# Patient Record
Sex: MALE | Race: BLACK OR AFRICAN AMERICAN
[De-identification: names, ages, dates, MRNs, and addresses within clinical notes are randomized per-mention and may not be internally consistent; named-entity substitution may affect disease eponyms.]

---

## 2017-03-21 ENCOUNTER — HOSPITAL ENCOUNTER (INPATIENT)
Dept: HOSPITAL 23 - P2S | Age: 52
LOS: 4 days | DRG: 897 | End: 2017-03-25
Admitting: SPECIALIST
Payer: COMMERCIAL

## 2017-03-21 DIAGNOSIS — F17.210: ICD-10-CM

## 2017-03-21 DIAGNOSIS — J44.9: ICD-10-CM

## 2017-03-21 DIAGNOSIS — F14.20: ICD-10-CM

## 2017-03-21 DIAGNOSIS — F33.1: ICD-10-CM

## 2017-03-21 DIAGNOSIS — F10.239: Primary | ICD-10-CM

## 2017-03-21 PROCEDURE — HZ2ZZZZ DETOXIFICATION SERVICES FOR SUBSTANCE ABUSE TREATMENT: ICD-10-PCS | Performed by: SPECIALIST

## 2017-03-22 LAB
BARBITURATES UR QL SCN: 0.9 MG/DL (ref 0.2–2)
BARBITURATES UR QL SCN: 3.6 G/DL (ref 3.5–5)
BARBITURATES: (no result)
BASOPHIL#: 0 X10E3 (ref 0–0.3)
BASOPHIL%: 0.6 % (ref 0–2.5)
BENZODIAZ UR QL SCN: 18 U/L (ref 10–42)
BENZODIAZ UR QL SCN: 19 U/L (ref 10–40)
BENZODIAZEPINES: (no result)
BLOOD UREA NITROGEN: 22 MG/DL (ref 9–23)
BUN/CREATININE RATIO: 22
BZE UR QL SCN: 72 U/L (ref 32–92)
CALCIUM SERUM: 9.2 MG/DL (ref 8.4–10.2)
CK MB SERPL-RTO: 13.8 % (ref 11–15.5)
CK MB SERPL-RTO: 32.7 G/DL (ref 30–36)
COCAINE: (no result)
CREATININE SERUM: 1 MG/DL (ref 0.6–1.4)
DIFF IND: NO
DX ICD CODE: (no result)
DX ICD CODE: (no result)
EOSINOPHIL#: 0.1 X10E3 (ref 0–0.7)
EOSINOPHIL%: 1.7 % (ref 0–7)
GLOM FILT RATE ESTIMATED: (no result) ML/MIN (ref 60–?)
GLUCOSE FASTING: 96 MG/DL (ref 70–110)
HEMATOCRIT: 47 % (ref 38–50)
HEMOGLOBIN: 15.4 GM/DL (ref 13–16)
KETONES UR QL: 108 MMOL/L (ref 100–111)
KETONES UR QL: 25 MMOL/L (ref 22–31)
LYMPHOCYTE#: 1.6 X10E3 (ref 1–3.5)
LYMPHOCYTE%: 18.9 % (ref 17–45)
MEAN CELL VOLUME: 97.8 FL (ref 83–96)
MEAN CORPUSCULAR HEMOGLOBIN: 32 PG (ref 28–34)
MEAN PLATELET VOLUME: 8.7 FL (ref 6.5–11.5)
MONOCYTE#: 0.8 X10E3 (ref 0–1)
MONOCYTE%: 9.1 % (ref 3–12)
NEUTROPHIL#: 5.9 X10E3 (ref 1.5–7.1)
NEUTROPHIL%: 69.7 % (ref 40–75)
OPIATES: (no result)
PLATELET COUNT: 286 X10E3 (ref 140–420)
POTASSIUM: 4.1 MMOL/L (ref 3.5–5.1)
PROTEIN TOTAL SERUM: 6.6 G/DL (ref 6–8.3)
RED BLOOD COUNT: 4.81 X10E (ref 3.9–5.6)
SODIUM: 139 MMOL/L (ref 135–145)
TRICYCLIC ANTIDEPRESSANTS: (no result)
U METHADONE: (no result)
URINE APPEARANCE: (no result)
URINE BILIRUBIN: (no result)
URINE BLOOD: (no result)
URINE COLOR: YELLOW
URINE GLUCOSE: (no result) MG/DL
URINE KETONE: (no result)
URINE LEUKOCYTE ESTERASE: (no result)
URINE NITRATE: (no result)
URINE PH: 5.5 (ref 5–8)
URINE PROTEIN: (no result)
URINE SOURCE: (no result)
URINE SPECIFIC GRAVITY: 1.02 (ref 1–1.03)
URINE UROBILINOGEN: 0.2 MG/DL
WHITE BLOOD COUNT: 8.5 X10E3 (ref 4–10.5)

## 2017-06-02 ENCOUNTER — HOSPITAL ENCOUNTER (EMERGENCY)
Facility: HOSPITAL | Age: 52
Discharge: HOME | End: 2017-06-02
Attending: EMERGENCY MEDICINE | Admitting: EMERGENCY MEDICINE

## 2017-06-02 VITALS
OXYGEN SATURATION: 99 % | DIASTOLIC BLOOD PRESSURE: 84 MMHG | WEIGHT: 170 LBS | HEIGHT: 67 IN | TEMPERATURE: 98.2 F | HEART RATE: 86 BPM | RESPIRATION RATE: 16 BRPM | BODY MASS INDEX: 26.68 KG/M2 | SYSTOLIC BLOOD PRESSURE: 121 MMHG

## 2017-06-02 DIAGNOSIS — Z71.1 CONCERN ABOUT STD IN MALE WITHOUT DIAGNOSIS: Primary | ICD-10-CM

## 2017-06-02 LAB
BACTERIA UR QL AUTO: ABNORMAL /HPF
BILIRUB UR QL STRIP: NEGATIVE
CLARITY UR: CLEAR
COLOR UR: ABNORMAL
GLUCOSE UR STRIP-MCNC: NEGATIVE MG/DL
HGB UR QL STRIP.AUTO: ABNORMAL
HYALINE CASTS UR QL AUTO: ABNORMAL /LPF
KETONES UR QL STRIP: ABNORMAL
LEUKOCYTE ESTERASE UR QL STRIP.AUTO: NEGATIVE
NITRITE UR QL STRIP: NEGATIVE
PH UR STRIP.AUTO: 5.5 [PH] (ref 5–8)
PROT UR QL STRIP: ABNORMAL
RBC # UR: ABNORMAL /HPF
REF LAB TEST METHOD: ABNORMAL
SP GR UR STRIP: 1.03 (ref 1–1.03)
SQUAMOUS #/AREA URNS HPF: ABNORMAL /HPF
UROBILINOGEN UR QL STRIP: ABNORMAL
WBC UR QL AUTO: ABNORMAL /HPF

## 2017-06-02 PROCEDURE — 25010000002 CEFTRIAXONE PER 250 MG: Performed by: NURSE PRACTITIONER

## 2017-06-02 PROCEDURE — 87591 N.GONORRHOEAE DNA AMP PROB: CPT | Performed by: NURSE PRACTITIONER

## 2017-06-02 PROCEDURE — 87491 CHLMYD TRACH DNA AMP PROBE: CPT | Performed by: NURSE PRACTITIONER

## 2017-06-02 PROCEDURE — 96372 THER/PROPH/DIAG INJ SC/IM: CPT

## 2017-06-02 PROCEDURE — 81001 URINALYSIS AUTO W/SCOPE: CPT | Performed by: NURSE PRACTITIONER

## 2017-06-02 PROCEDURE — 99284 EMERGENCY DEPT VISIT MOD MDM: CPT

## 2017-06-02 RX ORDER — AZITHROMYCIN 250 MG/1
1000 TABLET, FILM COATED ORAL ONCE
Status: COMPLETED | OUTPATIENT
Start: 2017-06-02 | End: 2017-06-02

## 2017-06-02 RX ORDER — CEFTRIAXONE SODIUM 250 MG/1
250 INJECTION, POWDER, FOR SOLUTION INTRAMUSCULAR; INTRAVENOUS EVERY 24 HOURS
Status: DISCONTINUED | OUTPATIENT
Start: 2017-06-02 | End: 2017-06-02 | Stop reason: HOSPADM

## 2017-06-02 RX ADMIN — CEFTRIAXONE SODIUM 250 MG: 250 INJECTION, POWDER, FOR SOLUTION INTRAMUSCULAR; INTRAVENOUS at 10:55

## 2017-06-02 RX ADMIN — AZITHROMYCIN 1000 MG: 250 TABLET, FILM COATED ORAL at 10:54

## 2017-06-02 NOTE — ED PROVIDER NOTES
"EMERGENCY DEPARTMENT ENCOUNTER    CHIEF COMPLAINT  Chief Complaint: Exposure to STD  History given by: Patient  History limited by: N/A  Room Number: 04/04  PMD: No Known Provider      HPI:  Pt is a 51 y.o. male who presents with exposure to an STD onset last night. Pt reports that he received oral sex last night and now reports penile pain. Pt reports mild dysuria and testicular tenderness. Pt denies any penile discharge or lesions. Pt reports that he relapsed last night and used cocaine and drank alcohol. Pt reports a similar episode within the past year.     Duration: Last night  Timing: Gradual  Location:   Radiation: Does not radiate  Quality: \"genital pain\"  Intensity/Severity: Moderate  Progression: Worsening  Associated Symptoms: Mild dysuria   Aggravating Factors: None  Alleviating Factors: None  Previous Episodes: Hx of STD exposure  Treatment before arrival: None    PAST MEDICAL HISTORY  Active Ambulatory Problems     Diagnosis Date Noted   • No Active Ambulatory Problems     Resolved Ambulatory Problems     Diagnosis Date Noted   • No Resolved Ambulatory Problems     No Additional Past Medical History       PAST SURGICAL HISTORY  Past Surgical History:   Procedure Laterality Date   • SHOULDER ACROMIOPLASTY         FAMILY HISTORY  History reviewed. No pertinent family history.    SOCIAL HISTORY  Social History     Social History   • Marital status: Significant Other     Spouse name: N/A   • Number of children: N/A   • Years of education: N/A     Occupational History   • Not on file.     Social History Main Topics   • Smoking status: Current Every Day Smoker   • Smokeless tobacco: Not on file   • Alcohol use Yes      Comment: socially   • Drug use: Not on file   • Sexual activity: Yes     Other Topics Concern   • Not on file     Social History Narrative   • No narrative on file         ALLERGIES  Ativan [lorazepam]; Haldol [haloperidol]; and Penicillins    REVIEW OF SYSTEMS  Review of Systems " "  Constitutional: Negative.  Negative for activity change, appetite change ( decreased), chills and fever.   HENT: Negative for congestion, ear pain, rhinorrhea, sinus pressure and sore throat.    Eyes: Negative.    Respiratory: Negative.  Negative for cough and shortness of breath.    Cardiovascular: Negative.  Negative for chest pain, palpitations and leg swelling ( pedal).   Gastrointestinal: Negative for abdominal pain, diarrhea, nausea and vomiting.   Endocrine: Negative.    Genitourinary: Positive for dysuria (mild), penile pain and testicular pain (\"tender\"). Negative for decreased urine volume, difficulty urinating, discharge, frequency and urgency.   Musculoskeletal: Negative.  Negative for back pain.   Skin: Negative.  Negative for rash.   Allergic/Immunologic: Negative.    Neurological: Negative.  Negative for dizziness, weakness, light-headedness, numbness and headaches.   Hematological: Negative.    Psychiatric/Behavioral: Negative.  The patient is not nervous/anxious.    All other systems reviewed and are negative.      PHYSICAL EXAM  ED Triage Vitals   Temp Heart Rate Resp BP SpO2   06/02/17 1010 06/02/17 1010 06/02/17 1011 06/02/17 1011 06/02/17 1010   98 °F (36.7 °C) 107 14 114/82 97 %      Temp src Heart Rate Source Patient Position BP Location FiO2 (%)   06/02/17 1010 06/02/17 1010 06/02/17 1011 06/02/17 1011 --   Tympanic Monitor Sitting Left arm        Physical Exam   Constitutional: He is well-developed, well-nourished, and in no distress. No distress.   HENT:   Head: Normocephalic.   Mouth/Throat: Oropharynx is clear and moist and mucous membranes are normal.   Eyes: Pupils are equal, round, and reactive to light.   Neck: Normal range of motion.   Cardiovascular: Normal rate, regular rhythm and normal heart sounds.    Pulmonary/Chest: Effort normal and breath sounds normal. He has no wheezes.   Abdominal: Soft. Bowel sounds are normal. There is no tenderness.   Genitourinary: Penis normal. He " exhibits no scrotal tenderness. Penis exhibits no lesions. No discharge found.   Musculoskeletal: Normal range of motion. He exhibits no edema.   Neurological: He is alert.   Skin: Skin is warm and dry. No rash noted.   Psychiatric: Mood, memory, affect and judgment normal.   Nursing note and vitals reviewed.      LAB RESULTS  Recent Results (from the past 24 hour(s))   Urinalysis With / Culture If Indicated    Collection Time: 06/02/17 10:41 AM   Result Value Ref Range    Color, UA Dark Yellow (A) Yellow, Straw    Appearance, UA Clear Clear    pH, UA 5.5 5.0 - 8.0    Specific Gravity, UA 1.026 1.005 - 1.030    Glucose, UA Negative Negative    Ketones, UA Trace (A) Negative    Bilirubin, UA Negative Negative    Blood, UA Trace (A) Negative    Protein, UA Trace (A) Negative    Leuk Esterase, UA Negative Negative    Nitrite, UA Negative Negative    Urobilinogen, UA 1.0 E.U./dL 0.2 - 1.0 E.U./dL   Urinalysis, Microscopic Only    Collection Time: 06/02/17 10:41 AM   Result Value Ref Range    RBC, UA 3-5 (A) None Seen, 0-2 /HPF    WBC, UA 0-2 None Seen, 0-2 /HPF    Bacteria, UA None Seen None Seen /HPF    Squamous Epithelial Cells, UA 0-2 None Seen, 0-2 /HPF    Hyaline Casts, UA 0-2 None Seen /LPF    Methodology Automated Microscopy        I ordered the above labs and reviewed the results.      PROGRESS AND CONSULTS    1151- Reviewed pt's history and workup with Dr. Velázquez.  After a bedside evaluation; Dr. Velázquez agrees with the plan of care.    COURSE & MEDICAL DECISION MAKING  Pertinent Labs that were ordered and reviewed are noted above.  Results were reviewed/discussed with the patient and they were also made aware of online assess.   Pt also made aware that some labs, such as cultures, will not be resulted during ER visit and follow up with PMD is necessary.     MEDICATIONS GIVEN IN ER  Medications   cefTRIAXone (ROCEPHIN) injection 250 mg (250 mg Intramuscular Given 6/2/17 1056)   azithromycin (ZITHROMAX)  "tablet 1,000 mg (1,000 mg Oral Given 6/2/17 1054)       /84  Pulse 86  Temp 98.2 °F (36.8 °C) (Tympanic)   Resp 16  Ht 67\" (170.2 cm)  Wt 170 lb (77.1 kg)  SpO2 99%  BMI 26.63 kg/m2    1203- Discussed all results and noted any abnormalities with patient.  Discussed absoute need to recheck abnormalities with PCP.  Reviewed implications of results, diagnosis, meds, responsibility to follow up, warning signs and symptoms of possible worsening, potential complications and reasons to return to ER with patient.  Discussed plan for discharge, as there is no emergent indication for admission.  Pt is agreeable and understands need for follow up and repeat testing.  Pt is aware that discharge does not mean that nothing is wrong but it indicates no emergency is present and they must continue care with PCP.  Pt is discharged with instructions to follow up with primary care doctor to have their blood pressure rechecked.       DIAGNOSIS  Final diagnoses:   Concern about STD in male without diagnosis       FOLLOW UP   [facility] REFERRAL SERVICE  [facility] [id number] [phone]          RX     Medication List      Notice     No changes were made to your prescriptions during this visit.        I personally reviewed the past medical history, past surgical history, social history, family history, current medications and allergies as they appear in this chart.  The scribe's note accurately reflects the work and decisions made by me.       I personally scribed for Susannah Ghosh on 6/2/2017 at 10:29 AM.  Electronically signed by Timothy Sewell on 6/2/2017 at time 10:29 AM         Timothy Sewell  06/02/17 1203       LILI Collins  06/02/17 1308    "

## 2017-06-02 NOTE — DISCHARGE INSTRUCTIONS
Pt instructions:  Rest  Always practice safe sex  Follow up with Primary Care Doctor for further management and to have your blood pressure rechecked.   Return to ER with pain, swelling, numbness/tingling, fever, chills, weakness, nausea, vomiting, diarrhea, abdominal pain, back pain, urinary concerns, chest pain, shortness of breath, dizziness, headache, worsening of symptoms or other concerns.

## 2017-06-02 NOTE — ED PROVIDER NOTES
I supervised care provided by the midlevel provider.    We have discussed this patient's history, physical exam, and treatment plan.   I have reviewed the note and personally saw and examined the patient and agree with the plan of care.      Pt presents to the ED with possible STD exposure last night. Pt states that he was engaged in oral sexual intercourse last night and has since had testicular/penile pain. Pt denies penile discharge or lesions present on the genitalia. On physical exam, abdomen is soft and nontender. UA and GC chlamydia labs ordered. Pt has received azithromycin and rocephin in the ER.               Documentation assistance provided by Ana Sheets. Information recorded by the scribe was done at my direction and has been verified and validated by me.     Entered by Ana Sheets, acting as scribe for Dr. Melania MD.             Ana Sheets  06/02/17 2039       Brodie Velázquez MD  06/02/17 2042

## 2017-06-03 LAB
C TRACH RRNA SPEC DONR QL NAA+PROBE: NEGATIVE
N GONORRHOEA DNA SPEC QL NAA+PROBE: NEGATIVE

## 2017-11-17 ENCOUNTER — HOSPITAL ENCOUNTER (EMERGENCY)
Facility: HOSPITAL | Age: 52
Discharge: HOME | End: 2017-11-18
Attending: EMERGENCY MEDICINE | Admitting: EMERGENCY MEDICINE

## 2017-11-17 DIAGNOSIS — F14.10 COCAINE ABUSE (HCC): ICD-10-CM

## 2017-11-17 DIAGNOSIS — F10.920 ACUTE ALCOHOLIC INTOXICATION WITHOUT COMPLICATION (HCC): Primary | ICD-10-CM

## 2017-11-17 LAB
ALBUMIN SERPL-MCNC: 4.7 G/DL (ref 3.5–5.2)
ALBUMIN/GLOB SERPL: 1.5 G/DL
ALP SERPL-CCNC: 96 U/L (ref 39–117)
ALT SERPL W P-5'-P-CCNC: 20 U/L (ref 1–41)
AMPHET+METHAMPHET UR QL: NEGATIVE
ANION GAP SERPL CALCULATED.3IONS-SCNC: 19 MMOL/L
AST SERPL-CCNC: 25 U/L (ref 1–40)
BARBITURATES UR QL SCN: NEGATIVE
BASOPHILS # BLD AUTO: 0.02 10*3/MM3 (ref 0–0.2)
BASOPHILS NFR BLD AUTO: 0.2 % (ref 0–1.5)
BENZODIAZ UR QL SCN: NEGATIVE
BILIRUB SERPL-MCNC: 0.3 MG/DL (ref 0.1–1.2)
BUN BLD-MCNC: 19 MG/DL (ref 6–20)
BUN/CREAT SERPL: 17.4 (ref 7–25)
CALCIUM SPEC-SCNC: 8.8 MG/DL (ref 8.6–10.5)
CANNABINOIDS SERPL QL: NEGATIVE
CHLORIDE SERPL-SCNC: 102 MMOL/L (ref 98–107)
CO2 SERPL-SCNC: 20 MMOL/L (ref 22–29)
COCAINE UR QL: POSITIVE
CREAT BLD-MCNC: 1.09 MG/DL (ref 0.76–1.27)
DEPRECATED RDW RBC AUTO: 49.4 FL (ref 37–54)
EOSINOPHIL # BLD AUTO: 0.08 10*3/MM3 (ref 0–0.7)
EOSINOPHIL NFR BLD AUTO: 0.7 % (ref 0.3–6.2)
ERYTHROCYTE [DISTWIDTH] IN BLOOD BY AUTOMATED COUNT: 13.8 % (ref 11.5–14.5)
ETHANOL BLD-MCNC: 138 MG/DL (ref 0–10)
ETHANOL UR QL: 0.14 %
GFR SERPL CREATININE-BSD FRML MDRD: 86 ML/MIN/1.73
GLOBULIN UR ELPH-MCNC: 3.2 GM/DL
GLUCOSE BLD-MCNC: 91 MG/DL (ref 65–99)
HCT VFR BLD AUTO: 45.3 % (ref 40.4–52.2)
HGB BLD-MCNC: 15.3 G/DL (ref 13.7–17.6)
IMM GRANULOCYTES # BLD: 0.02 10*3/MM3 (ref 0–0.03)
IMM GRANULOCYTES NFR BLD: 0.2 % (ref 0–0.5)
LYMPHOCYTES # BLD AUTO: 2.55 10*3/MM3 (ref 0.9–4.8)
LYMPHOCYTES NFR BLD AUTO: 23.5 % (ref 19.6–45.3)
MCH RBC QN AUTO: 32.6 PG (ref 27–32.7)
MCHC RBC AUTO-ENTMCNC: 33.8 G/DL (ref 32.6–36.4)
MCV RBC AUTO: 96.4 FL (ref 79.8–96.2)
METHADONE UR QL SCN: NEGATIVE
MONOCYTES # BLD AUTO: 0.89 10*3/MM3 (ref 0.2–1.2)
MONOCYTES NFR BLD AUTO: 8.2 % (ref 5–12)
NEUTROPHILS # BLD AUTO: 7.29 10*3/MM3 (ref 1.9–8.1)
NEUTROPHILS NFR BLD AUTO: 67.2 % (ref 42.7–76)
OPIATES UR QL: NEGATIVE
OXYCODONE UR QL SCN: NEGATIVE
PLATELET # BLD AUTO: 339 10*3/MM3 (ref 140–500)
PMV BLD AUTO: 10.5 FL (ref 6–12)
POTASSIUM BLD-SCNC: 3.6 MMOL/L (ref 3.5–5.2)
PROT SERPL-MCNC: 7.9 G/DL (ref 6–8.5)
RBC # BLD AUTO: 4.7 10*6/MM3 (ref 4.6–6)
SODIUM BLD-SCNC: 141 MMOL/L (ref 136–145)
WBC NRBC COR # BLD: 10.85 10*3/MM3 (ref 4.5–10.7)

## 2017-11-17 PROCEDURE — 80307 DRUG TEST PRSMV CHEM ANLYZR: CPT | Performed by: NURSE PRACTITIONER

## 2017-11-17 PROCEDURE — 80053 COMPREHEN METABOLIC PANEL: CPT | Performed by: NURSE PRACTITIONER

## 2017-11-17 PROCEDURE — 85025 COMPLETE CBC W/AUTO DIFF WBC: CPT | Performed by: NURSE PRACTITIONER

## 2017-11-17 PROCEDURE — 96372 THER/PROPH/DIAG INJ SC/IM: CPT

## 2017-11-17 PROCEDURE — 99284 EMERGENCY DEPT VISIT MOD MDM: CPT

## 2017-11-17 RX ORDER — OLANZAPINE 10 MG/1
10 INJECTION, POWDER, LYOPHILIZED, FOR SOLUTION INTRAMUSCULAR ONCE
Status: COMPLETED | OUTPATIENT
Start: 2017-11-17 | End: 2017-11-17

## 2017-11-17 RX ADMIN — OLANZAPINE 10 MG: 10 INJECTION, POWDER, FOR SOLUTION INTRAMUSCULAR at 19:02

## 2017-11-18 VITALS
BODY MASS INDEX: 25.11 KG/M2 | HEART RATE: 81 BPM | WEIGHT: 160 LBS | OXYGEN SATURATION: 99 % | SYSTOLIC BLOOD PRESSURE: 91 MMHG | HEIGHT: 67 IN | DIASTOLIC BLOOD PRESSURE: 50 MMHG | RESPIRATION RATE: 18 BRPM

## 2017-11-18 NOTE — ED NOTES
Resting quietly.  Appears to be sleeping.  Sitter at bedside for one on one observation.       Ning Cohn RN  11/17/17 7148

## 2017-11-18 NOTE — ED PROVIDER NOTES
Pt care was turned over from Susannah Ghosh.    Pt presents to the ED with significant ETOH intoxication. On exam, pt was resting comfortably.     0210  BP- (!) 89/49 HR- 78 Temp-   O2 sat- 92%  Rechecked the patient who is in NAD and is resting comfortably. Pt advised that he feels as if he is ready to go home and that he has someone to transport him home. He denies SI and HI. Pt said he was drunk and had used cocaine. Pt was advised to cease narcotic and ETOH use.     Final diagnoses:   Acute alcoholic intoxication without complication   Cocaine abuse     DISCHARGE    Patient discharged in stable condition.    Reviewed implications of results, diagnosis, meds, responsibility to follow up, warning signs and symptoms of possible worsening, potential complications and reasons to return to ER.    Patient/Family voiced understanding of above instructions.    Discussed plan for discharge, as there is no emergent indication for admission.  Pt/family is agreeable and understands need for follow up and repeat testing.  Pt is aware that discharge does not mean that nothing is wrong but it indicates no emergency is present that requires admission and they must continue care with follow-up as given below or physician of their choice.     FOLLOW-UP  [facility] REFERRAL SERVICE  [facility] [id number] [phone]  Schedule an appointment as soon as possible for a visit           Medication List      Notice     No changes were made to your prescriptions during this visit.            Attestation:    We have discussed this patient's history, physical exam, and treatment plan.   I have reviewed the note and personally saw and examined the patient and agree with the plan of care.    Documentation assistance provided by young Renteria for Catalino Sommer PA-C. Information recorded by the young was done at my direction and has been verified and validated by me.         Paloma Renteria  11/18/17  0219       CHRISTIAN Wilson  11/18/17 0316

## 2017-11-18 NOTE — ED PROVIDER NOTES
"EMERGENCY DEPARTMENT ENCOUNTER    CHIEF COMPLAINT  Chief Complaint: Alcohol Intoxication  History given by:Pt  History limited by:Nothing  Room Number: 03/03  PMD: Provider Not In System      HPI:  Pt is a 51 y.o. male who presents complaining of alcohol intoxication onset PTA. He states he drank about 8 beers today. He reports he relapsed and used cocaine PTA and one day ago. Pt denies HI and SI. He denies CP, SOA, abd pain, nausea, or any other symptoms at this time. He reports he is paranoid, nervous, and does not know if someone is trying to hurt him. He attributes these feelings to his cocaine use PTA. Per EMS, pt has been combative.    Duration: PTA  Timing:gradual  Location:n/a  Radiation:n/a  Quality:\"alcohol intoxication\"  Intensity/Severity:moderate  Progression:unchanged  Associated Symptoms:none  Aggravating Factors:none  Alleviating Factors:none  Previous Episodes:none  Treatment before arrival:Pt received no treatment PTA.    PAST MEDICAL HISTORY  Active Ambulatory Problems     Diagnosis Date Noted   • No Active Ambulatory Problems     Resolved Ambulatory Problems     Diagnosis Date Noted   • No Resolved Ambulatory Problems     No Additional Past Medical History       PAST SURGICAL HISTORY  Past Surgical History:   Procedure Laterality Date   • SHOULDER ACROMIOPLASTY         FAMILY HISTORY  No family history on file.    SOCIAL HISTORY  Social History     Social History   • Marital status: Single     Spouse name: N/A   • Number of children: N/A   • Years of education: N/A     Occupational History   • Not on file.     Social History Main Topics   • Smoking status: Current Every Day Smoker   • Smokeless tobacco: Not on file   • Alcohol use Yes      Comment: socially   • Drug use: Not on file   • Sexual activity: Yes     Other Topics Concern   • Not on file     Social History Narrative   • No narrative on file         ALLERGIES  Ativan [lorazepam]; Haldol [haloperidol]; and Penicillins    REVIEW OF " SYSTEMS  Review of Systems   Constitutional: Positive for activity change (alcohol intoxication and cocaine usage). Negative for appetite change ( decreased), chills and fever.   HENT: Negative for congestion, ear pain, rhinorrhea, sinus pressure and sore throat.    Eyes: Negative.    Respiratory: Negative.  Negative for cough and shortness of breath.    Cardiovascular: Negative.  Negative for chest pain, palpitations and leg swelling ( pedal).   Gastrointestinal: Negative for abdominal pain, diarrhea, nausea and vomiting.   Endocrine: Negative.    Genitourinary: Negative.  Negative for decreased urine volume, difficulty urinating, dysuria, frequency and urgency.   Musculoskeletal: Negative.  Negative for back pain.   Skin: Negative.  Negative for rash.   Allergic/Immunologic: Negative.    Neurological: Negative.  Negative for dizziness, weakness, light-headedness, numbness and headaches.   Hematological: Negative.    Psychiatric/Behavioral: Negative.  The patient is not nervous/anxious.    All other systems reviewed and are negative.      PHYSICAL EXAM  ED Triage Vitals   Temp Pulse Resp BP SpO2   -- -- -- -- --             Temp src Heart Rate Source Patient Position BP Location FiO2 (%)   -- -- -- -- --              Physical Exam   Constitutional: He is well-developed, well-nourished, and in no distress. No distress.   HENT:   Head: Normocephalic.   Mouth/Throat: Oropharynx is clear and moist and mucous membranes are normal.   Eyes: Pupils are equal, round, and reactive to light.   Neck: Normal range of motion.   Cardiovascular: Normal rate, regular rhythm and normal heart sounds.    Pulmonary/Chest: Effort normal and breath sounds normal. He has no wheezes.   Abdominal: Soft. Bowel sounds are normal. There is no tenderness.   Musculoskeletal: Normal range of motion. He exhibits no edema.   Neurological: He is alert.   Skin: Skin is warm and dry. No rash noted.   Psychiatric: He is agitated.   Paranoid   Yelling  "and cursing at staff   Nursing note and vitals reviewed.      LAB RESULTS  Recent Results (from the past 24 hour(s))   CBC Auto Differential    Collection Time: 11/17/17  7:15 PM   Result Value Ref Range    WBC 10.85 (H) 4.50 - 10.70 10*3/mm3    RBC 4.70 4.60 - 6.00 10*6/mm3    Hemoglobin 15.3 13.7 - 17.6 g/dL    Hematocrit 45.3 40.4 - 52.2 %    MCV 96.4 (H) 79.8 - 96.2 fL    MCH 32.6 27.0 - 32.7 pg    MCHC 33.8 32.6 - 36.4 g/dL    RDW 13.8 11.5 - 14.5 %    RDW-SD 49.4 37.0 - 54.0 fl    MPV 10.5 6.0 - 12.0 fL    Platelets 339 140 - 500 10*3/mm3    Neutrophil % 67.2 42.7 - 76.0 %    Lymphocyte % 23.5 19.6 - 45.3 %    Monocyte % 8.2 5.0 - 12.0 %    Eosinophil % 0.7 0.3 - 6.2 %    Basophil % 0.2 0.0 - 1.5 %    Immature Grans % 0.2 0.0 - 0.5 %    Neutrophils, Absolute 7.29 1.90 - 8.10 10*3/mm3    Lymphocytes, Absolute 2.55 0.90 - 4.80 10*3/mm3    Monocytes, Absolute 0.89 0.20 - 1.20 10*3/mm3    Eosinophils, Absolute 0.08 0.00 - 0.70 10*3/mm3    Basophils, Absolute 0.02 0.00 - 0.20 10*3/mm3    Immature Grans, Absolute 0.02 0.00 - 0.03 10*3/mm3       I ordered the above labs and reviewed the results      PROGRESS AND CONSULTS    1945- pt resting now on stretcher.  Provided with crackers and soda.  Father at bedside trying to keep patient calm    2000 Pt's care turned over to Dr. Sorensen and Catalino Sommer PA-C pending lab results and sobriety.        COURSE & MEDICAL DECISION MAKING  Pertinent Labs studies that were ordered and reviewed are noted above.  Results were reviewed/discussed with the patient and they were also made aware of online assess.   Pt also made aware that some labs, such as cultures, will not be resulted during ER visit and follow up with PMD is necessary.     MEDICATIONS GIVEN IN ER  Medications   OLANZapine (zyPREXA) injection 10 mg (10 mg Intramuscular Given 11/17/17 1902)       Ht 67\" (170.2 cm)  Wt 160 lb (72.6 kg)  BMI 25.06 kg/m2          Documentation assistance provided by young Alvarez " Braxton for LILI Acuna.  Information recorded by the scribe was done at my direction and has been verified and validated by me.     Antonio Limon  11/17/17 1922       LILI Collins  11/17/17 2001

## 2020-01-04 ENCOUNTER — APPOINTMENT (OUTPATIENT)
Dept: GENERAL RADIOLOGY | Facility: HOSPITAL | Age: 55
End: 2020-01-04

## 2020-01-04 ENCOUNTER — APPOINTMENT (OUTPATIENT)
Dept: CARDIOLOGY | Facility: HOSPITAL | Age: 55
End: 2020-01-04

## 2020-01-04 ENCOUNTER — HOSPITAL ENCOUNTER (OUTPATIENT)
Facility: HOSPITAL | Age: 55
Setting detail: OBSERVATION
Discharge: HOME | End: 2020-01-05
Attending: EMERGENCY MEDICINE | Admitting: HOSPITALIST

## 2020-01-04 DIAGNOSIS — R07.9 CHEST PAIN, UNSPECIFIED TYPE: ICD-10-CM

## 2020-01-04 DIAGNOSIS — R77.8 ELEVATED TROPONIN: ICD-10-CM

## 2020-01-04 DIAGNOSIS — F14.10 COCAINE ABUSE (HCC): Primary | ICD-10-CM

## 2020-01-04 LAB
ALBUMIN SERPL-MCNC: 4.5 G/DL (ref 3.5–5.2)
ALBUMIN/GLOB SERPL: 1.5 G/DL
ALP SERPL-CCNC: 83 U/L (ref 39–117)
ALT SERPL W P-5'-P-CCNC: 40 U/L (ref 1–41)
AMPHET+METHAMPHET UR QL: POSITIVE
ANION GAP SERPL CALCULATED.3IONS-SCNC: 13.5 MMOL/L (ref 5–15)
ANION GAP SERPL CALCULATED.3IONS-SCNC: 14.7 MMOL/L (ref 5–15)
AORTIC DIMENSIONLESS INDEX: 0.8 (DI)
AST SERPL-CCNC: 29 U/L (ref 1–40)
B PARAPERT DNA SPEC QL NAA+PROBE: NOT DETECTED
B PERT DNA SPEC QL NAA+PROBE: NOT DETECTED
BARBITURATES UR QL SCN: NEGATIVE
BASOPHILS # BLD AUTO: 0.04 10*3/MM3 (ref 0–0.2)
BASOPHILS NFR BLD AUTO: 0.4 % (ref 0–1.5)
BENZODIAZ UR QL SCN: NEGATIVE
BH CV ECHO MEAS - AO MAX PG (FULL): 6.5 MMHG
BH CV ECHO MEAS - AO MAX PG: 10.8 MMHG
BH CV ECHO MEAS - AO MEAN PG (FULL): 3 MMHG
BH CV ECHO MEAS - AO MEAN PG: 5 MMHG
BH CV ECHO MEAS - AO ROOT AREA (BSA CORRECTED): 1.6
BH CV ECHO MEAS - AO ROOT AREA: 7.1 CM^2
BH CV ECHO MEAS - AO ROOT DIAM: 3 CM
BH CV ECHO MEAS - AO V2 MAX: 164 CM/SEC
BH CV ECHO MEAS - AO V2 MEAN: 99.3 CM/SEC
BH CV ECHO MEAS - AO V2 VTI: 27 CM
BH CV ECHO MEAS - ASC AORTA: 3.2 CM
BH CV ECHO MEAS - AVA(I,A): 1.9 CM^2
BH CV ECHO MEAS - AVA(I,D): 1.9 CM^2
BH CV ECHO MEAS - AVA(V,A): 1.6 CM^2
BH CV ECHO MEAS - AVA(V,D): 1.6 CM^2
BH CV ECHO MEAS - BSA(HAYCOCK): 1.9 M^2
BH CV ECHO MEAS - BSA: 1.9 M^2
BH CV ECHO MEAS - BZI_BMI: 26.6 KILOGRAMS/M^2
BH CV ECHO MEAS - BZI_METRIC_HEIGHT: 170.2 CM
BH CV ECHO MEAS - BZI_METRIC_WEIGHT: 77.1 KG
BH CV ECHO MEAS - EDV(CUBED): 74.1 ML
BH CV ECHO MEAS - EDV(MOD-SP2): 85 ML
BH CV ECHO MEAS - EDV(MOD-SP4): 95 ML
BH CV ECHO MEAS - EDV(TEICH): 78.6 ML
BH CV ECHO MEAS - EF(CUBED): 73.4 %
BH CV ECHO MEAS - EF(MOD-BP): 71 %
BH CV ECHO MEAS - EF(MOD-SP2): 68.2 %
BH CV ECHO MEAS - EF(MOD-SP4): 73.7 %
BH CV ECHO MEAS - EF(TEICH): 65.6 %
BH CV ECHO MEAS - ESV(CUBED): 19.7 ML
BH CV ECHO MEAS - ESV(MOD-SP2): 27 ML
BH CV ECHO MEAS - ESV(MOD-SP4): 25 ML
BH CV ECHO MEAS - ESV(TEICH): 27 ML
BH CV ECHO MEAS - FS: 35.7 %
BH CV ECHO MEAS - IVS/LVPW: 1
BH CV ECHO MEAS - IVSD: 1 CM
BH CV ECHO MEAS - LAT PEAK E' VEL: 12.4 CM/SEC
BH CV ECHO MEAS - LV DIASTOLIC VOL/BSA (35-75): 50.3 ML/M^2
BH CV ECHO MEAS - LV MASS(C)D: 137.2 GRAMS
BH CV ECHO MEAS - LV MASS(C)DI: 72.7 GRAMS/M^2
BH CV ECHO MEAS - LV MAX PG: 4.2 MMHG
BH CV ECHO MEAS - LV MEAN PG: 2 MMHG
BH CV ECHO MEAS - LV SYSTOLIC VOL/BSA (12-30): 13.2 ML/M^2
BH CV ECHO MEAS - LV V1 MAX: 103 CM/SEC
BH CV ECHO MEAS - LV V1 MEAN: 73.7 CM/SEC
BH CV ECHO MEAS - LV V1 VTI: 20.5 CM
BH CV ECHO MEAS - LVIDD: 4.2 CM
BH CV ECHO MEAS - LVIDS: 2.7 CM
BH CV ECHO MEAS - LVLD AP2: 8.2 CM
BH CV ECHO MEAS - LVLD AP4: 8.5 CM
BH CV ECHO MEAS - LVLS AP2: 6.5 CM
BH CV ECHO MEAS - LVLS AP4: 6.6 CM
BH CV ECHO MEAS - LVOT AREA (M): 2.5 CM^2
BH CV ECHO MEAS - LVOT AREA: 2.5 CM^2
BH CV ECHO MEAS - LVOT DIAM: 1.8 CM
BH CV ECHO MEAS - LVPWD: 1 CM
BH CV ECHO MEAS - MED PEAK E' VEL: 10.3 CM/SEC
BH CV ECHO MEAS - MV A DUR: 127 SEC
BH CV ECHO MEAS - MV A MAX VEL: 66.9 CM/SEC
BH CV ECHO MEAS - MV DEC SLOPE: 840 CM/SEC^2
BH CV ECHO MEAS - MV DEC TIME: 209 SEC
BH CV ECHO MEAS - MV E MAX VEL: 96.5 CM/SEC
BH CV ECHO MEAS - MV E/A: 1.4
BH CV ECHO MEAS - MV MAX PG: 4.8 MMHG
BH CV ECHO MEAS - MV MEAN PG: 1 MMHG
BH CV ECHO MEAS - MV P1/2T MAX VEL: 119 CM/SEC
BH CV ECHO MEAS - MV P1/2T: 41.5 MSEC
BH CV ECHO MEAS - MV V2 MAX: 110 CM/SEC
BH CV ECHO MEAS - MV V2 MEAN: 49.4 CM/SEC
BH CV ECHO MEAS - MV V2 VTI: 36.1 CM
BH CV ECHO MEAS - MVA P1/2T LCG: 1.8 CM^2
BH CV ECHO MEAS - MVA(P1/2T): 5.3 CM^2
BH CV ECHO MEAS - MVA(VTI): 1.4 CM^2
BH CV ECHO MEAS - PA ACC TIME: 0.12 SEC
BH CV ECHO MEAS - PA MAX PG (FULL): 6.3 MMHG
BH CV ECHO MEAS - PA MAX PG: 8.5 MMHG
BH CV ECHO MEAS - PA PR(ACCEL): 26.8 MMHG
BH CV ECHO MEAS - PA V2 MAX: 146 CM/SEC
BH CV ECHO MEAS - PULM A REVS DUR: 0.1 SEC
BH CV ECHO MEAS - PULM A REVS VEL: 30.6 CM/SEC
BH CV ECHO MEAS - PULM DIAS VEL: 48 CM/SEC
BH CV ECHO MEAS - PULM S/D: 1.2
BH CV ECHO MEAS - PULM SYS VEL: 57.7 CM/SEC
BH CV ECHO MEAS - PVA(V,A): 2.5 CM^2
BH CV ECHO MEAS - PVA(V,D): 2.5 CM^2
BH CV ECHO MEAS - QP/QS: 1.3
BH CV ECHO MEAS - RV MAX PG: 2.2 MMHG
BH CV ECHO MEAS - RV MEAN PG: 1 MMHG
BH CV ECHO MEAS - RV V1 MAX: 74.6 CM/SEC
BH CV ECHO MEAS - RV V1 MEAN: 51.5 CM/SEC
BH CV ECHO MEAS - RV V1 VTI: 14.3 CM
BH CV ECHO MEAS - RVOT AREA: 4.9 CM^2
BH CV ECHO MEAS - RVOT DIAM: 2.5 CM
BH CV ECHO MEAS - SI(AO): 101.1 ML/M^2
BH CV ECHO MEAS - SI(CUBED): 28.8 ML/M^2
BH CV ECHO MEAS - SI(LVOT): 27.6 ML/M^2
BH CV ECHO MEAS - SI(MOD-SP2): 30.7 ML/M^2
BH CV ECHO MEAS - SI(MOD-SP4): 37.1 ML/M^2
BH CV ECHO MEAS - SI(TEICH): 27.3 ML/M^2
BH CV ECHO MEAS - SV(AO): 190.9 ML
BH CV ECHO MEAS - SV(CUBED): 54.4 ML
BH CV ECHO MEAS - SV(LVOT): 52.2 ML
BH CV ECHO MEAS - SV(MOD-SP2): 58 ML
BH CV ECHO MEAS - SV(MOD-SP4): 70 ML
BH CV ECHO MEAS - SV(RVOT): 70.2 ML
BH CV ECHO MEAS - SV(TEICH): 51.6 ML
BH CV ECHO MEAS - TAPSE (>1.6): 3 CM
BH CV ECHO MEASUREMENTS AVERAGE E/E' RATIO: 8.5
BH CV STRESS ECHO POST STRESS EJECTION FRACTION EF: 77 %
BH CV XLRA - RV BASE: 3.92 CM
BH CV XLRA - TDI S': 16.9 CM/SEC
BILIRUB SERPL-MCNC: 0.2 MG/DL (ref 0.2–1.2)
BUN BLD-MCNC: 11 MG/DL (ref 6–20)
BUN BLD-MCNC: 11 MG/DL (ref 6–20)
BUN/CREAT SERPL: 11.2 (ref 7–25)
BUN/CREAT SERPL: 12.9 (ref 7–25)
C PNEUM DNA NPH QL NAA+NON-PROBE: NOT DETECTED
CALCIUM SPEC-SCNC: 8.4 MG/DL (ref 8.6–10.5)
CALCIUM SPEC-SCNC: 8.7 MG/DL (ref 8.6–10.5)
CANNABINOIDS SERPL QL: NEGATIVE
CHLORIDE SERPL-SCNC: 103 MMOL/L (ref 98–107)
CHLORIDE SERPL-SCNC: 104 MMOL/L (ref 98–107)
CO2 SERPL-SCNC: 23.3 MMOL/L (ref 22–29)
CO2 SERPL-SCNC: 23.5 MMOL/L (ref 22–29)
COCAINE UR QL: POSITIVE
CREAT BLD-MCNC: 0.85 MG/DL (ref 0.76–1.27)
CREAT BLD-MCNC: 0.98 MG/DL (ref 0.76–1.27)
DEPRECATED RDW RBC AUTO: 43.6 FL (ref 37–54)
DEPRECATED RDW RBC AUTO: 44.5 FL (ref 37–54)
EOSINOPHIL # BLD AUTO: 0.05 10*3/MM3 (ref 0–0.4)
EOSINOPHIL NFR BLD AUTO: 0.6 % (ref 0.3–6.2)
ERYTHROCYTE [DISTWIDTH] IN BLOOD BY AUTOMATED COUNT: 12.9 % (ref 12.3–15.4)
ERYTHROCYTE [DISTWIDTH] IN BLOOD BY AUTOMATED COUNT: 13 % (ref 12.3–15.4)
ETHANOL BLD-MCNC: 89 MG/DL (ref 0–10)
ETHANOL UR QL: 0.09 %
FLUAV H1 2009 PAND RNA NPH QL NAA+PROBE: NOT DETECTED
FLUAV H1 HA GENE NPH QL NAA+PROBE: NOT DETECTED
FLUAV H3 RNA NPH QL NAA+PROBE: NOT DETECTED
FLUAV SUBTYP SPEC NAA+PROBE: NOT DETECTED
FLUBV RNA ISLT QL NAA+PROBE: NOT DETECTED
GFR SERPL CREATININE-BSD FRML MDRD: 113 ML/MIN/1.73
GFR SERPL CREATININE-BSD FRML MDRD: 96 ML/MIN/1.73
GLOBULIN UR ELPH-MCNC: 3 GM/DL
GLUCOSE BLD-MCNC: 83 MG/DL (ref 65–99)
GLUCOSE BLD-MCNC: 96 MG/DL (ref 65–99)
HADV DNA SPEC NAA+PROBE: NOT DETECTED
HCOV 229E RNA SPEC QL NAA+PROBE: NOT DETECTED
HCOV HKU1 RNA SPEC QL NAA+PROBE: NOT DETECTED
HCOV NL63 RNA SPEC QL NAA+PROBE: NOT DETECTED
HCOV OC43 RNA SPEC QL NAA+PROBE: NOT DETECTED
HCT VFR BLD AUTO: 38.5 % (ref 37.5–51)
HCT VFR BLD AUTO: 44.5 % (ref 37.5–51)
HGB BLD-MCNC: 13.5 G/DL (ref 13–17.7)
HGB BLD-MCNC: 14.9 G/DL (ref 13–17.7)
HMPV RNA NPH QL NAA+NON-PROBE: NOT DETECTED
HPIV1 RNA SPEC QL NAA+PROBE: NOT DETECTED
HPIV2 RNA SPEC QL NAA+PROBE: NOT DETECTED
HPIV3 RNA NPH QL NAA+PROBE: NOT DETECTED
HPIV4 P GENE NPH QL NAA+PROBE: NOT DETECTED
IMM GRANULOCYTES # BLD AUTO: 0.03 10*3/MM3 (ref 0–0.05)
IMM GRANULOCYTES NFR BLD AUTO: 0.3 % (ref 0–0.5)
LEFT ATRIUM VOLUME INDEX: 17.4 ML/M2
LV EF 2D ECHO EST: 60 %
LYMPHOCYTES # BLD AUTO: 1.8 10*3/MM3 (ref 0.7–3.1)
LYMPHOCYTES NFR BLD AUTO: 20.2 % (ref 19.6–45.3)
M PNEUMO IGG SER IA-ACNC: NOT DETECTED
MAXIMAL PREDICTED HEART RATE: 164 BPM
MCH RBC QN AUTO: 31.6 PG (ref 26.6–33)
MCH RBC QN AUTO: 33 PG (ref 26.6–33)
MCHC RBC AUTO-ENTMCNC: 33.5 G/DL (ref 31.5–35.7)
MCHC RBC AUTO-ENTMCNC: 35.1 G/DL (ref 31.5–35.7)
MCV RBC AUTO: 94.1 FL (ref 79–97)
MCV RBC AUTO: 94.3 FL (ref 79–97)
METHADONE UR QL SCN: NEGATIVE
MONOCYTES # BLD AUTO: 0.8 10*3/MM3 (ref 0.1–0.9)
MONOCYTES NFR BLD AUTO: 9 % (ref 5–12)
NEUTROPHILS # BLD AUTO: 6.21 10*3/MM3 (ref 1.7–7)
NEUTROPHILS NFR BLD AUTO: 69.5 % (ref 42.7–76)
NRBC BLD AUTO-RTO: 0 /100 WBC (ref 0–0.2)
OPIATES UR QL: NEGATIVE
OXYCODONE UR QL SCN: NEGATIVE
PLATELET # BLD AUTO: 288 10*3/MM3 (ref 140–450)
PLATELET # BLD AUTO: 325 10*3/MM3 (ref 140–450)
PMV BLD AUTO: 9.5 FL (ref 6–12)
PMV BLD AUTO: 9.7 FL (ref 6–12)
POTASSIUM BLD-SCNC: 3.7 MMOL/L (ref 3.5–5.2)
POTASSIUM BLD-SCNC: 4 MMOL/L (ref 3.5–5.2)
PROT SERPL-MCNC: 7.5 G/DL (ref 6–8.5)
RBC # BLD AUTO: 4.09 10*6/MM3 (ref 4.14–5.8)
RBC # BLD AUTO: 4.72 10*6/MM3 (ref 4.14–5.8)
RHINOVIRUS RNA SPEC NAA+PROBE: NOT DETECTED
RSV RNA NPH QL NAA+NON-PROBE: NOT DETECTED
SODIUM BLD-SCNC: 141 MMOL/L (ref 136–145)
SODIUM BLD-SCNC: 141 MMOL/L (ref 136–145)
STRESS TARGET HR: 139 BPM
TROPONIN T SERPL-MCNC: 0.02 NG/ML (ref 0–0.03)
TROPONIN T SERPL-MCNC: 0.03 NG/ML (ref 0–0.03)
TROPONIN T SERPL-MCNC: 0.04 NG/ML (ref 0–0.03)
TROPONIN T SERPL-MCNC: 0.04 NG/ML (ref 0–0.03)
TROPONIN T SERPL-MCNC: <0.01 NG/ML (ref 0–0.03)
WBC NRBC COR # BLD: 8.86 10*3/MM3 (ref 3.4–10.8)
WBC NRBC COR # BLD: 8.93 10*3/MM3 (ref 3.4–10.8)

## 2020-01-04 PROCEDURE — 80307 DRUG TEST PRSMV CHEM ANLYZR: CPT | Performed by: PHYSICIAN ASSISTANT

## 2020-01-04 PROCEDURE — 93325 DOPPLER ECHO COLOR FLOW MAPG: CPT

## 2020-01-04 PROCEDURE — G0378 HOSPITAL OBSERVATION PER HR: HCPCS

## 2020-01-04 PROCEDURE — 36415 COLL VENOUS BLD VENIPUNCTURE: CPT

## 2020-01-04 PROCEDURE — 0100U HC BIOFIRE FILMARRAY RESP PANEL 2: CPT | Performed by: HOSPITALIST

## 2020-01-04 PROCEDURE — 93352 ADMIN ECG CONTRAST AGENT: CPT | Performed by: INTERNAL MEDICINE

## 2020-01-04 PROCEDURE — 84484 ASSAY OF TROPONIN QUANT: CPT | Performed by: PHYSICIAN ASSISTANT

## 2020-01-04 PROCEDURE — 93320 DOPPLER ECHO COMPLETE: CPT | Performed by: INTERNAL MEDICINE

## 2020-01-04 PROCEDURE — 93350 STRESS TTE ONLY: CPT

## 2020-01-04 PROCEDURE — 25010000002 PERFLUTREN (DEFINITY) 8.476 MG IN SODIUM CHLORIDE 0.9 % 10 ML INJECTION: Performed by: INTERNAL MEDICINE

## 2020-01-04 PROCEDURE — 99244 OFF/OP CNSLTJ NEW/EST MOD 40: CPT | Performed by: INTERNAL MEDICINE

## 2020-01-04 PROCEDURE — 93005 ELECTROCARDIOGRAM TRACING: CPT | Performed by: PHYSICIAN ASSISTANT

## 2020-01-04 PROCEDURE — 80053 COMPREHEN METABOLIC PANEL: CPT | Performed by: PHYSICIAN ASSISTANT

## 2020-01-04 PROCEDURE — 93017 CV STRESS TEST TRACING ONLY: CPT

## 2020-01-04 PROCEDURE — 90791 PSYCH DIAGNOSTIC EVALUATION: CPT

## 2020-01-04 PROCEDURE — 93010 ELECTROCARDIOGRAM REPORT: CPT | Performed by: INTERNAL MEDICINE

## 2020-01-04 PROCEDURE — 93325 DOPPLER ECHO COLOR FLOW MAPG: CPT | Performed by: INTERNAL MEDICINE

## 2020-01-04 PROCEDURE — 84484 ASSAY OF TROPONIN QUANT: CPT | Performed by: NURSE PRACTITIONER

## 2020-01-04 PROCEDURE — 85027 COMPLETE CBC AUTOMATED: CPT | Performed by: NURSE PRACTITIONER

## 2020-01-04 PROCEDURE — 93018 CV STRESS TEST I&R ONLY: CPT | Performed by: INTERNAL MEDICINE

## 2020-01-04 PROCEDURE — 93320 DOPPLER ECHO COMPLETE: CPT

## 2020-01-04 PROCEDURE — 99285 EMERGENCY DEPT VISIT HI MDM: CPT

## 2020-01-04 PROCEDURE — 71046 X-RAY EXAM CHEST 2 VIEWS: CPT

## 2020-01-04 PROCEDURE — 85025 COMPLETE CBC W/AUTO DIFF WBC: CPT | Performed by: PHYSICIAN ASSISTANT

## 2020-01-04 PROCEDURE — 93016 CV STRESS TEST SUPVJ ONLY: CPT | Performed by: INTERNAL MEDICINE

## 2020-01-04 PROCEDURE — 93350 STRESS TTE ONLY: CPT | Performed by: INTERNAL MEDICINE

## 2020-01-04 RX ORDER — NICOTINE 21 MG/24HR
1 PATCH, TRANSDERMAL 24 HOURS TRANSDERMAL EVERY 24 HOURS
Status: DISCONTINUED | OUTPATIENT
Start: 2020-01-04 | End: 2020-01-05 | Stop reason: HOSPADM

## 2020-01-04 RX ORDER — ACETAMINOPHEN 650 MG/1
650 SUPPOSITORY RECTAL EVERY 4 HOURS PRN
Status: DISCONTINUED | OUTPATIENT
Start: 2020-01-04 | End: 2020-01-05 | Stop reason: HOSPADM

## 2020-01-04 RX ORDER — ASPIRIN 325 MG
325 TABLET ORAL ONCE
Status: COMPLETED | OUTPATIENT
Start: 2020-01-04 | End: 2020-01-04

## 2020-01-04 RX ORDER — SODIUM CHLORIDE 9 MG/ML
75 INJECTION, SOLUTION INTRAVENOUS CONTINUOUS
Status: DISCONTINUED | OUTPATIENT
Start: 2020-01-04 | End: 2020-01-05 | Stop reason: HOSPADM

## 2020-01-04 RX ORDER — ACETAMINOPHEN 160 MG/5ML
650 SOLUTION ORAL EVERY 4 HOURS PRN
Status: DISCONTINUED | OUTPATIENT
Start: 2020-01-04 | End: 2020-01-05 | Stop reason: HOSPADM

## 2020-01-04 RX ORDER — NITROGLYCERIN 0.4 MG/1
0.4 TABLET SUBLINGUAL
Status: DISCONTINUED | OUTPATIENT
Start: 2020-01-04 | End: 2020-01-05 | Stop reason: HOSPADM

## 2020-01-04 RX ORDER — BISACODYL 5 MG/1
5 TABLET, DELAYED RELEASE ORAL DAILY PRN
Status: DISCONTINUED | OUTPATIENT
Start: 2020-01-04 | End: 2020-01-05 | Stop reason: HOSPADM

## 2020-01-04 RX ORDER — ACETAMINOPHEN 325 MG/1
650 TABLET ORAL EVERY 4 HOURS PRN
Status: DISCONTINUED | OUTPATIENT
Start: 2020-01-04 | End: 2020-01-05 | Stop reason: HOSPADM

## 2020-01-04 RX ORDER — ONDANSETRON 4 MG/1
4 TABLET, FILM COATED ORAL EVERY 6 HOURS PRN
Status: DISCONTINUED | OUTPATIENT
Start: 2020-01-04 | End: 2020-01-05 | Stop reason: HOSPADM

## 2020-01-04 RX ORDER — SODIUM CHLORIDE 0.9 % (FLUSH) 0.9 %
10 SYRINGE (ML) INJECTION EVERY 12 HOURS SCHEDULED
Status: DISCONTINUED | OUTPATIENT
Start: 2020-01-04 | End: 2020-01-05 | Stop reason: HOSPADM

## 2020-01-04 RX ORDER — SODIUM CHLORIDE 0.9 % (FLUSH) 0.9 %
10 SYRINGE (ML) INJECTION AS NEEDED
Status: DISCONTINUED | OUTPATIENT
Start: 2020-01-04 | End: 2020-01-05 | Stop reason: HOSPADM

## 2020-01-04 RX ORDER — CALCIUM CARBONATE 200(500)MG
2 TABLET,CHEWABLE ORAL 2 TIMES DAILY PRN
Status: DISCONTINUED | OUTPATIENT
Start: 2020-01-04 | End: 2020-01-05 | Stop reason: HOSPADM

## 2020-01-04 RX ORDER — ONDANSETRON 2 MG/ML
4 INJECTION INTRAMUSCULAR; INTRAVENOUS EVERY 6 HOURS PRN
Status: DISCONTINUED | OUTPATIENT
Start: 2020-01-04 | End: 2020-01-05 | Stop reason: HOSPADM

## 2020-01-04 RX ADMIN — SODIUM CHLORIDE, PRESERVATIVE FREE 10 ML: 5 INJECTION INTRAVENOUS at 20:20

## 2020-01-04 RX ADMIN — SODIUM CHLORIDE, PRESERVATIVE FREE 10 ML: 5 INJECTION INTRAVENOUS at 08:36

## 2020-01-04 RX ADMIN — SODIUM CHLORIDE 75 ML/HR: 9 INJECTION, SOLUTION INTRAVENOUS at 05:06

## 2020-01-04 RX ADMIN — NICOTINE 1 PATCH: 14 PATCH, EXTENDED RELEASE TRANSDERMAL at 20:23

## 2020-01-04 RX ADMIN — SODIUM CHLORIDE, PRESERVATIVE FREE 10 ML: 5 INJECTION INTRAVENOUS at 05:06

## 2020-01-04 RX ADMIN — SODIUM CHLORIDE 75 ML/HR: 9 INJECTION, SOLUTION INTRAVENOUS at 20:20

## 2020-01-04 RX ADMIN — ASPIRIN 325 MG: 325 TABLET ORAL at 05:37

## 2020-01-04 RX ADMIN — PERFLUTREN 5 ML: 6.52 INJECTION, SUSPENSION INTRAVENOUS at 12:50

## 2020-01-04 NOTE — CONSULTS
"Pt interviewed alone in Rm 531.  Pt was not willing to go through the entire interview.  He did want information.  He  Reports being single, 3 adult children with good relationships.  His 85 yo father is alive, lives alone.  Pt has lived with father off/on throughout his adult life.  He states father lives \"[place]town and that's a problem for me.\"   Pt indicates that if lives with father then runs into friends that use drugs.       Currently pt reports he is homeless after break-up with a GF last week.  Often evasive.      He is dismissive of any depression questions or mental health questions.  He denies inpt psych but old MR indicate pt was paranoid, hallucinating d/t drug use in 2006 that resulted in admit to [facility].  Adamantly denies current SI/HI.      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      Unclear if using ETOH currently.  He states drug of choice is cocaine, started in early 20s.  He never seeks out meth by his report, states he only seeks cocaine.  UDS + for cocaine, meth.      He goes back and forth on wanting residential CD treatment and not wanting CD rx because of work.   He did reveal recently left [facility] early.  He stated  has been to [facility] in past for the full stay.  He has been to [facility] multiple times, [facility] multiple times, doesn't like [facility].  Longest sober was 2 yr, many years ago.  He has a sponsor, last spoke to him 1.5 wk ago.  Does go to AA/NA meetings, states he stopped going 1.5 wk ago because had to help others.      Pt has pending stress test today.  Gave him the sober living housing list for men, encouraged him to make some calls.  Gave him the phone number for [facility] which is a 6 month program, encouraged him to call them.  No interest in [facility].  With some persistence on my part, he admitted to living in homeless shelters " "in past.      Eventually pt thanked me for the effort, apologized for being \"irritable.\"  AC will follow, d/w RN.        "

## 2020-01-04 NOTE — PLAN OF CARE
Stress Echo completed today. No c/o CP. Pt caught smoking in BR after echo, cigarettes taken away and educated patient. Nicotene patch ordered. CIWA remains a 3. Will continue to monitor.

## 2020-01-04 NOTE — ED TRIAGE NOTES
To ER via EMS.  Pt was picked up at [facility].  Pt called father very paranoid and locked in bathroom.  Father called PD, PD called EMS.  Pt admits to unknown amount of cocaine and ETOH.

## 2020-01-04 NOTE — PLAN OF CARE
Problem: Patient Care Overview  Goal: Plan of Care Review  Outcome: Ongoing (interventions implemented as appropriate)  Flowsheets (Taken 1/4/2020 0583)  Progress: no change  Plan of Care Reviewed With: patient  Outcome Summary: patient admitted from ER for complaints of  chest pain on right side, positive drug screen and elevated troponins. cardiac monitoring per md orders. patient is alert and oriented and in no acute distress. vitals stable. npo per cardiology order. ivf's started. will continue to monitor.

## 2020-01-04 NOTE — ED PROVIDER NOTES
EMERGENCY DEPARTMENT ENCOUNTER    CHIEF COMPLAINT  Chief Complaint: left lateral chest pain  History given by: patient  History limited by: none  Room Number: N531/1  PMD: System, Provider Not In      HPI:  Pt is a 54 y.o. male who presents complaining of one episode of left lateral chest pain radiating to back occurring today after taking cocaine. Pt reports episode lasted around 15 minutes and that pain has improved. Pt also complains of productive cough with clear sputum and nausea but denies fever and chills. Pt confirms recent sick contacts.  He does state he feels anxious at this time, but denies SI and HI.      PAST MEDICAL HISTORY  Active Ambulatory Problems     Diagnosis Date Noted   • No Active Ambulatory Problems     Resolved Ambulatory Problems     Diagnosis Date Noted   • No Resolved Ambulatory Problems     No Additional Past Medical History       PAST SURGICAL HISTORY  Past Surgical History:   Procedure Laterality Date   • SHOULDER ACROMIOPLASTY         FAMILY HISTORY  History reviewed. No pertinent family history.    SOCIAL HISTORY  Social History     Socioeconomic History   • Marital status: Single     Spouse name: Not on file   • Number of children: Not on file   • Years of education: Not on file   • Highest education level: Not on file   Tobacco Use   • Smoking status: Current Every Day Smoker   Substance and Sexual Activity   • Alcohol use: Yes     Comment: socially   • Sexual activity: Yes       ALLERGIES  Anaprox [naproxen]; Ativan [lorazepam]; Haldol [haloperidol]; and Penicillins    REVIEW OF SYSTEMS  Review of Systems   Constitutional: Negative for activity change, appetite change, chills and fever.   HENT: Negative for congestion and sore throat.    Eyes: Negative.    Respiratory: Positive for cough (productive with clear sputum). Negative for shortness of breath.    Cardiovascular: Positive for chest pain (left lateral). Negative for leg swelling.   Gastrointestinal: Positive for nausea.  Negative for abdominal pain, diarrhea and vomiting.   Endocrine: Negative.    Genitourinary: Negative for decreased urine volume and dysuria.   Musculoskeletal: Negative for neck pain.   Skin: Negative for rash and wound.   Allergic/Immunologic: Negative.    Neurological: Negative for weakness, numbness and headaches.   Hematological: Negative.    Psychiatric/Behavioral: Negative.    All other systems reviewed and are negative.      PHYSICAL EXAM  ED Triage Vitals [01/04/20 0032]   Temp Heart Rate Resp BP SpO2   97 °F (36.1 °C) 120 18 140/78 97 %      Temp src Heart Rate Source Patient Position BP Location FiO2 (%)   Tympanic Monitor -- -- --       Physical Exam   Constitutional: He is oriented to person, place, and time. He appears distressed (mild distress (anxious)).   HENT:   Head: Normocephalic and atraumatic.   Eyes: Pupils are equal, round, and reactive to light. EOM are normal.   Neck: Normal range of motion. Neck supple.   Cardiovascular: Normal rate, regular rhythm and normal heart sounds.   No murmur heard.  Pulmonary/Chest: Effort normal and breath sounds normal. No respiratory distress. He exhibits no tenderness.   Abdominal: Soft. There is no tenderness. There is no rebound and no guarding.   Musculoskeletal: Normal range of motion. He exhibits no edema.   Neurological: He is alert and oriented to person, place, and time. He has normal sensation and normal strength.   Skin: Skin is warm and dry.   Psychiatric: His mood appears anxious.   Anxious   Nursing note and vitals reviewed.      LAB RESULTS  Lab Results (last 24 hours)     Procedure Component Value Units Date/Time    Ethanol [[id number]]  (Abnormal) Collected:  01/04/20 0108    Specimen:  Blood from Arm, Right Updated:  01/04/20 0154     Ethanol 89 mg/dL      Ethanol % 0.089 %     CBC & Differential [[id number]] Collected:  01/04/20 0108    Specimen:  Blood from Arm, Right Updated:  01/04/20 0136    Narrative:       The following orders were  created for panel order CBC & Differential.  Procedure                               Abnormality         Status                     ---------                               -----------         ------                     CBC Auto Differential[[id number]]        Normal              Final result                 Please view results for these tests on the individual orders.    Comprehensive Metabolic Panel [[id number]] Collected:  01/04/20 0108    Specimen:  Blood from Arm, Right Updated:  01/04/20 0154     Glucose 96 mg/dL      BUN 11 mg/dL      Creatinine 0.98 mg/dL      Sodium 141 mmol/L      Potassium 3.7 mmol/L      Chloride 103 mmol/L      CO2 23.3 mmol/L      Calcium 8.7 mg/dL      Total Protein 7.5 g/dL      Albumin 4.50 g/dL      ALT (SGPT) 40 U/L      AST (SGOT) 29 U/L      Alkaline Phosphatase 83 U/L      Total Bilirubin 0.2 mg/dL      eGFR  African Amer 96 mL/min/1.73      Globulin 3.0 gm/dL      A/G Ratio 1.5 g/dL      BUN/Creatinine Ratio 11.2     Anion Gap 14.7 mmol/L     Narrative:       GFR Normal >60  Chronic Kidney Disease <60  Kidney Failure <15      CBC Auto Differential [[id number]]  (Normal) Collected:  01/04/20 0108    Specimen:  Blood from Arm, Right Updated:  01/04/20 0136     WBC 8.93 10*3/mm3      RBC 4.72 10*6/mm3      Hemoglobin 14.9 g/dL      Hematocrit 44.5 %      MCV 94.3 fL      MCH 31.6 pg      MCHC 33.5 g/dL      RDW 13.0 %      RDW-SD 44.5 fl      MPV 9.7 fL      Platelets 325 10*3/mm3      Neutrophil % 69.5 %      Lymphocyte % 20.2 %      Monocyte % 9.0 %      Eosinophil % 0.6 %      Basophil % 0.4 %      Immature Grans % 0.3 %      Neutrophils, Absolute 6.21 10*3/mm3      Lymphocytes, Absolute 1.80 10*3/mm3      Monocytes, Absolute 0.80 10*3/mm3      Eosinophils, Absolute 0.05 10*3/mm3      Basophils, Absolute 0.04 10*3/mm3      Immature Grans, Absolute 0.03 10*3/mm3      nRBC 0.0 /100 WBC     Troponin [[id number]]  (Abnormal) Collected:  01/04/20 0108    Specimen:  Blood from Arm, Right  Updated:  01/04/20 0156     Troponin T 0.037 ng/mL     Narrative:       Troponin T Reference Range:  <= 0.03 ng/mL-   Negative for AMI  >0.03 ng/mL-     Abnormal for myocardial necrosis.  Clinicians would have to utilize clinical acumen, EKG, Troponin and serial changes to determine if it is an Acute Myocardial Infarction or myocardial injury due to an underlying chronic condition.     Troponin [[id number]]  (Abnormal) Collected:  01/04/20 0312    Specimen:  Blood Updated:  01/04/20 0354     Troponin T 0.040 ng/mL     Narrative:       Troponin T Reference Range:  <= 0.03 ng/mL-   Negative for AMI  >0.03 ng/mL-     Abnormal for myocardial necrosis.  Clinicians would have to utilize clinical acumen, EKG, Troponin and serial changes to determine if it is an Acute Myocardial Infarction or myocardial injury due to an underlying chronic condition.     Basic Metabolic Panel [[id number]]  (Abnormal) Collected:  01/04/20 0312    Specimen:  Blood Updated:  01/04/20 0347     Glucose 83 mg/dL      BUN 11 mg/dL      Creatinine 0.85 mg/dL      Sodium 141 mmol/L      Potassium 4.0 mmol/L      Chloride 104 mmol/L      CO2 23.5 mmol/L      Calcium 8.4 mg/dL      eGFR   Amer 113 mL/min/1.73      BUN/Creatinine Ratio 12.9     Anion Gap 13.5 mmol/L     Narrative:       GFR Normal >60  Chronic Kidney Disease <60  Kidney Failure <15      Urine Drug Screen - Urine, Clean Catch [[id number]]  (Abnormal) Collected:  01/04/20 0336    Specimen:  Urine, Clean Catch Updated:  01/04/20 0409     Amphet/Methamphet, Screen Positive     Barbiturates Screen, Urine Negative     Benzodiazepine Screen, Urine Negative     Cocaine Screen, Urine Positive     Opiate Screen Negative     THC, Screen, Urine Negative     Methadone Screen, Urine Negative     Oxycodone Screen, Urine Negative    Narrative:       Negative Thresholds For Drugs Screened:     Amphetamines               500 ng/ml   Barbiturates               200 ng/ml   Benzodiazepines             100 ng/ml   Cocaine                    300 ng/ml   Methadone                  300 ng/ml   Opiates                    300 ng/ml   Oxycodone                  100 ng/ml   THC                        50 ng/ml    The Normal Value for all drugs tested is negative. This report includes final unconfirmed screening results to be used for medical treatment purposes only. Unconfirmed results must not be used for non-medical purposes such as employment or legal testing. Clinical consideration should be applied to any drug of abuse test, particulary when unconfirmed results are used.          I ordered the above labs and reviewed the results    RADIOLOGY  XR Chest 2 View   Final Result   Mild diffuse interstitial prominence. Pneumonia is not excluded.   Short-term follow-up exam is recommended.       This report was finalized on 1/4/2020 1:48 AM by Dr. Selam Douglass M.D.               I ordered the above noted radiological studies. Interpreted by radiologist.  Reviewed by me in PACS.       PROCEDURES  Procedures    EKG          EKG time: 0105  Rhythm/Rate: NSR 82  P waves and OK: normal  QRS, axis: normal   ST and T waves: non specific T wave changes in lateral leads     Interpreted Contemporaneously by me, independently viewed  No prior to compare      PROGRESS AND CONSULTS  ED Course as of Jan 04 0634   Sat Jan 04, 2020   0204 Troponin [RC]      ED Course User Index  [RC] Nando Hagan III, PA        0206 [place] [facility]    0218 Discussed pt case with Dr Hargrove. After seeing the pt, Dr Hargrove agrees with plan of care to repeat troponin, consult cardiology and admit the pt for further evaluation.    0220 placed call to [facility]    0227 discussed pt case with Dr. Winston, [facility] agrees to consult the pt and requests medicine admit the pt due to multiple issues surrounding pt's case.  Since patient is not actively having CP, to give ASA at this time and trend the troponin.    0243 discussed pt case with LILI Bryant  for Dr. Fitzpatrick, [facility], who agrees to admit the pt.     0311 pt rechecked and resting. Informed pt of plan to labs and imaging results. Discussed plan to admit for further evaluation. Pt understands and agrees with the plan. All questions have been answered.         MEDICAL DECISION MAKING  Results were reviewed/discussed with the patient and they were also made aware of online access. Pt also made aware that some labs, such as cultures, will not be resulted during ER visit and follow up with PMD is necessary.     MDM  Number of Diagnoses or Management Options     Amount and/or Complexity of Data Reviewed  Clinical lab tests: reviewed and ordered (Initial Trop 0.037  EtOH 89)  Tests in the radiology section of CPT®: reviewed and ordered (Chest XR: Mild diffuse interstitial prominence. Pneumonia is not excluded.)  Tests in the medicine section of CPT®: reviewed and ordered (See procedure note)  Review and summarize past medical records: yes (Pt seen at [facility] ED11/2017 for EtOH and cocaine use)           DIAGNOSIS  Final diagnoses:   Cocaine abuse (CMS/HCC)   Elevated troponin   Chest pain, unspecified type (resolved in ER)       DISPOSITION  ADMISSION    Discussed treatment plan and reason for admission with pt/family and admitting physician.  Pt/family voiced understanding of the plan for admission for further testing/treatment as needed.         Latest Documented Vital Signs:  As of 6:34 AM  BP- 113/79 HR- 88 Temp- 97.8 °F (36.6 °C) (Oral) O2 sat- 99%    --  Documentation assistance provided by young Choi for CHRISTIAN Worrell.  Information recorded by the scribe was done at my direction and has been verified and validated by me.                       Abby Choi  01/04/20 0312       Nando Hagan III, PA  01/04/20 [id number]

## 2020-01-04 NOTE — ED PROVIDER NOTES
MD ATTESTATION NOTE    The JOE and I have discussed this patient's history, physical exam, and treatment plan.  I have reviewed the documentation and personally had a face to face interaction with the patient. I affirm the documentation and agree with the treatment and plan.  The attached note describes my personal findings.    Patient presents with some paranoia and chest pain after using cocaine.  He has no chest pain on arrival, the EKG has some J-point elevation but no reciprocal changes to suggest ischemia.  His vitals are stable, but his first troponin is slightly elevated.  He is going to need to be admitted for cardiology consultation for cocaine associated chest pain and possible non-STEMI.     Daniel Hargrove MD  01/04/20 0228

## 2020-01-04 NOTE — H&P
HISTORY AND PHYSICAL   [facility]        Patient Identification:  Name: Byron Del Cid  Age: 54 y.o.  Sex: male  :  1965  MRN: [id number]                     Primary Care Physician: System, Provider Not In    Chief Complaint: Chest pain    History of Present Illness:         The patient is a 54-year-old -American male with history of drug abuse who was admitted with history of having some chest pain.  He had been coughing some recently and is been a little short of air.  He been using some cocaine again.  The patient was evaluated in the ER and admitted for further evaluation treatment.    Past Medical History:  History reviewed. No pertinent past medical history.  Past Surgical History:  Past Surgical History:   Procedure Laterality Date   • SHOULDER ACROMIOPLASTY        Home Meds:  No medications prior to admission.     Current meds    Current Facility-Administered Medications:   •  acetaminophen (TYLENOL) tablet 650 mg, 650 mg, Oral, Q4H PRN **OR** acetaminophen (TYLENOL) 160 MG/5ML solution 650 mg, 650 mg, Oral, Q4H PRN **OR** acetaminophen (TYLENOL) suppository 650 mg, 650 mg, Rectal, Q4H PRN, Sirisha Paez APRN  •  bisacodyl (DULCOLAX) EC tablet 5 mg, 5 mg, Oral, Daily PRN, Sirisha Paez APRN  •  calcium carbonate (TUMS) chewable tablet 500 mg (200 mg elemental), 2 tablet, Oral, BID PRN, Sirisha Paez APRN  •  nicotine (NICODERM CQ) 14 MG/24HR patch 1 patch, 1 patch, Transdermal, Q24H, Ivan Haji MD  •  nitroglycerin (NITROSTAT) SL tablet 0.4 mg, 0.4 mg, Sublingual, Q5 Min PRN, Sirisha Paez APRN  •  ondansetron (ZOFRAN) tablet 4 mg, 4 mg, Oral, Q6H PRN **OR** ondansetron (ZOFRAN) injection 4 mg, 4 mg, Intravenous, Q6H PRN, Sirisha Paez APRN  •  sodium chloride 0.9 % flush 10 mL, 10 mL, Intravenous, Q12H, Sirisha Paez APRN, 10 mL at 01/04/20 0836  •  sodium chloride 0.9 % flush 10 mL, 10 mL, Intravenous, PRN, Sirisha Paez,  "APRN  •  sodium chloride 0.9 % infusion, 75 mL/hr, Intravenous, Continuous, Sirisha Paez, APRN, Last Rate: 75 mL/hr at 20 0506, 75 mL/hr at 20 0506  Allergies:  Allergies   Allergen Reactions   • Anaprox [Naproxen] Other (See Comments)     \"locks mouth up\"   • Ativan [Lorazepam]    • Haldol [Haloperidol]    • Penicillins      Immunizations:    There is no immunization history on file for this patient.  Social History:   Social History     Social History Narrative   • Not on file     Social History     Socioeconomic History   • Marital status: Single     Spouse name: Not on file   • Number of children: Not on file   • Years of education: Not on file   • Highest education level: Not on file   Tobacco Use   • Smoking status: Current Every Day Smoker   Substance and Sexual Activity   • Alcohol use: Yes     Comment: socially   • Drug use: Yes     Types: Cocaine   • Sexual activity: Yes       Family History:  History reviewed. No pertinent family history.     Review of Systems  See history of present illness and past medical history.  Patient denies headache, dizziness, syncope, falls, trauma, change in vision, change in hearing, change in taste, changes in weight, changes in appetite, focal weakness, numbness, or paresthesia.  Patient denies  palpitations, dyspnea, orthopnea, PND, cough, sinus pressure, rhinorrhea, epistaxis, hemoptysis, nausea, vomiting, hematemesis, diarrhea, constipation or hematchezia.  Denies cold or heat intolerance, polydipsia, polyuria, polyphagia. Denies hematuria, pyuria, dysuria, hesitancy, frequency or urgency.Remainder of ROS is negative.    Objective:  tMax 24 hrs: Temp (24hrs), Av.6 °F (36.4 °C), Min:97 °F (36.1 °C), Max:98 °F (36.7 °C)    Vitals Ranges:   Temp:  [97 °F (36.1 °C)-98 °F (36.7 °C)] 98 °F (36.7 °C)  Heart Rate:  [] 73  Resp:  [16-18] 18  BP: (106-140)/(65-79) 106/70      Exam:  /70 (BP Location: Right arm, Patient Position: Lying)   Pulse " "73   Temp 98 °F (36.7 °C) (Oral)   Resp 18   Ht 170 cm (66.93\")   Wt 75 kg (165 lb 5.5 oz)   SpO2 99%   BMI 25.95 kg/m²     General Appearance:    Alert, cooperative, no distress, appears stated age   Head:    Normocephalic, without obvious abnormality, atraumatic   Eyes:    PERRL, conjunctiva/corneas clear, EOM's intact, both eyes   Ears:    Normal external ear canals, both ears   Nose:   Nares normal, septum midline, mucosa normal, no drainage    or sinus tenderness   Throat:   Lips, mucosa, and tongue normal   Neck:   Supple, symmetrical, trachea midline, no adenopathy;     thyroid:  no enlargement/tenderness/nodules; no carotid    bruit or JVD   Back:     Symmetric, no curvature, ROM normal, no CVA tenderness   Lungs:    Occasional crackles, respirations unlabored   Chest Wall:    No tenderness or deformity    Heart:    Regular rate and rhythm, S1 and S2 normal, no murmur, rub   or gallop   Abdomen:     Soft, non-tender, bowel sounds active all four quadrants,     no masses, no hepatomegaly, no splenomegaly   Extremities:   Extremities normal, atraumatic, no cyanosis or edema   Pulses:   2+ and symmetric all extremities   Skin:   Skin color, texture, turgor normal, no rashes or lesions   Lymph nodes:   Cervical, supraclavicular, and axillary nodes normal   Neurologic:   CNII-XII intact, normal strength, sensation intact throughout      .    Data Review:  Lab Results (last 72 hours)     Procedure Component Value Units Date/Time    Troponin [[id number]] Collected:  01/04/20 1514    Specimen:  Blood Updated:  01/04/20 1548    Troponin [[id number]]  (Normal) Collected:  01/04/20 0856    Specimen:  Blood Updated:  01/04/20 0946     Troponin T 0.026 ng/mL     Narrative:       Troponin T Reference Range:  <= 0.03 ng/mL-   Negative for AMI  >0.03 ng/mL-     Abnormal for myocardial necrosis.  Clinicians would have to utilize clinical acumen, EKG, Troponin and serial changes to determine if it is an Acute Myocardial " Infarction or myocardial injury due to an underlying chronic condition.     CBC (No Diff) [[id number]]  (Abnormal) Collected:  01/04/20 0856    Specimen:  Blood Updated:  01/04/20 0925     WBC 8.86 10*3/mm3      RBC 4.09 10*6/mm3      Hemoglobin 13.5 g/dL      Hematocrit 38.5 %      MCV 94.1 fL      MCH 33.0 pg      MCHC 35.1 g/dL      RDW 12.9 %      RDW-SD 43.6 fl      MPV 9.5 fL      Platelets 288 10*3/mm3     Urine Drug Screen - Urine, Clean Catch [[id number]]  (Abnormal) Collected:  01/04/20 0336    Specimen:  Urine, Clean Catch Updated:  01/04/20 0409     Amphet/Methamphet, Screen Positive     Barbiturates Screen, Urine Negative     Benzodiazepine Screen, Urine Negative     Cocaine Screen, Urine Positive     Opiate Screen Negative     THC, Screen, Urine Negative     Methadone Screen, Urine Negative     Oxycodone Screen, Urine Negative    Narrative:       Negative Thresholds For Drugs Screened:     Amphetamines               500 ng/ml   Barbiturates               200 ng/ml   Benzodiazepines            100 ng/ml   Cocaine                    300 ng/ml   Methadone                  300 ng/ml   Opiates                    300 ng/ml   Oxycodone                  100 ng/ml   THC                        50 ng/ml    The Normal Value for all drugs tested is negative. This report includes final unconfirmed screening results to be used for medical treatment purposes only. Unconfirmed results must not be used for non-medical purposes such as employment or legal testing. Clinical consideration should be applied to any drug of abuse test, particulary when unconfirmed results are used.    Troponin [[id number]]  (Abnormal) Collected:  01/04/20 0312    Specimen:  Blood Updated:  01/04/20 0354     Troponin T 0.040 ng/mL     Narrative:       Troponin T Reference Range:  <= 0.03 ng/mL-   Negative for AMI  >0.03 ng/mL-     Abnormal for myocardial necrosis.  Clinicians would have to utilize clinical acumen, EKG, Troponin and serial changes  to determine if it is an Acute Myocardial Infarction or myocardial injury due to an underlying chronic condition.     Basic Metabolic Panel [[id number]]  (Abnormal) Collected:  01/04/20 0312    Specimen:  Blood Updated:  01/04/20 0347     Glucose 83 mg/dL      BUN 11 mg/dL      Creatinine 0.85 mg/dL      Sodium 141 mmol/L      Potassium 4.0 mmol/L      Chloride 104 mmol/L      CO2 23.5 mmol/L      Calcium 8.4 mg/dL      eGFR   Amer 113 mL/min/1.73      BUN/Creatinine Ratio 12.9     Anion Gap 13.5 mmol/L     Narrative:       GFR Normal >60  Chronic Kidney Disease <60  Kidney Failure <15      Troponin [[id number]]  (Abnormal) Collected:  01/04/20 0108    Specimen:  Blood from Arm, Right Updated:  01/04/20 0156     Troponin T 0.037 ng/mL     Narrative:       Troponin T Reference Range:  <= 0.03 ng/mL-   Negative for AMI  >0.03 ng/mL-     Abnormal for myocardial necrosis.  Clinicians would have to utilize clinical acumen, EKG, Troponin and serial changes to determine if it is an Acute Myocardial Infarction or myocardial injury due to an underlying chronic condition.     Ethanol [[id number]]  (Abnormal) Collected:  01/04/20 0108    Specimen:  Blood from Arm, Right Updated:  01/04/20 0154     Ethanol 89 mg/dL      Ethanol % 0.089 %     Comprehensive Metabolic Panel [[id number]] Collected:  01/04/20 0108    Specimen:  Blood from Arm, Right Updated:  01/04/20 0154     Glucose 96 mg/dL      BUN 11 mg/dL      Creatinine 0.98 mg/dL      Sodium 141 mmol/L      Potassium 3.7 mmol/L      Chloride 103 mmol/L      CO2 23.3 mmol/L      Calcium 8.7 mg/dL      Total Protein 7.5 g/dL      Albumin 4.50 g/dL      ALT (SGPT) 40 U/L      AST (SGOT) 29 U/L      Alkaline Phosphatase 83 U/L      Total Bilirubin 0.2 mg/dL      eGFR  African Amer 96 mL/min/1.73      Globulin 3.0 gm/dL      A/G Ratio 1.5 g/dL      BUN/Creatinine Ratio 11.2     Anion Gap 14.7 mmol/L     Narrative:       GFR Normal >60  Chronic Kidney Disease <60  Kidney  Failure <15      CBC & Differential [[id number]] Collected:  01/04/20 0108    Specimen:  Blood from Arm, Right Updated:  01/04/20 0136    Narrative:       The following orders were created for panel order CBC & Differential.  Procedure                               Abnormality         Status                     ---------                               -----------         ------                     CBC Auto Differential[[id number]]        Normal              Final result                 Please view results for these tests on the individual orders.    CBC Auto Differential [[id number]]  (Normal) Collected:  01/04/20 0108    Specimen:  Blood from Arm, Right Updated:  01/04/20 0136     WBC 8.93 10*3/mm3      RBC 4.72 10*6/mm3      Hemoglobin 14.9 g/dL      Hematocrit 44.5 %      MCV 94.3 fL      MCH 31.6 pg      MCHC 33.5 g/dL      RDW 13.0 %      RDW-SD 44.5 fl      MPV 9.7 fL      Platelets 325 10*3/mm3      Neutrophil % 69.5 %      Lymphocyte % 20.2 %      Monocyte % 9.0 %      Eosinophil % 0.6 %      Basophil % 0.4 %      Immature Grans % 0.3 %      Neutrophils, Absolute 6.21 10*3/mm3      Lymphocytes, Absolute 1.80 10*3/mm3      Monocytes, Absolute 0.80 10*3/mm3      Eosinophils, Absolute 0.05 10*3/mm3      Basophils, Absolute 0.04 10*3/mm3      Immature Grans, Absolute 0.03 10*3/mm3      nRBC 0.0 /100 WBC                    Imaging Results (All)     Procedure Component Value Units Date/Time    XR Chest 2 View [[id number]] Collected:  01/04/20 0146     Updated:  01/04/20 0152    Narrative:       PA AND LATERAL CHEST RADIOGRAPH     HISTORY: Left-sided chest pain. Cough.     COMPARISON: None available.     FINDINGS:  Heart size is within normal limits. Mild diffuse interstitial prominence  is noted. No pneumothorax or pleural effusion is seen.       Impression:       Mild diffuse interstitial prominence. Pneumonia is not excluded.  Short-term follow-up exam is recommended.     This report was finalized on 1/4/2020 1:48 AM  by Dr. Selam Douglass M.D.           History reviewed. No pertinent past medical history.    Assessment:  Active Hospital Problems    Diagnosis  POA   • **Cocaine abuse (CMS/HCC) [F14.10]  Yes   • Elevated troponin [R79.89]  Unknown   • Chest pain [R07.9]  Unknown      Resolved Hospital Problems   No resolved problems to display.       Plan:  The patient is admitted to the hospital with cardiology consult.  Access center is also been consulted in regard to his cocaine abuse.  He was encouraged to stop using cocaine.  With his respiratory symptoms I am concerned he may have some sort of a viral infection.  We will get respiratory PCR and follow-up.    Ivan Haji MD  1/4/2020  7:34 PM

## 2020-01-04 NOTE — CONSULTS
"Date of Hospital Visit: 20  Encounter Provider: Alessandro Schafer MD  Place of Service: [facility] CARDIOLOGY  Patient Name: Byron Del Cid  :1965  [id number]  Referral Provider: Guilherme Fitzpatrick MD    Chief complaint:Chest Pain    History of Present Illness:  Byron Del Cid is a 54 year old male with no cardiac history. He denies hypertension, hyperlipidemia, or previous heart attack or chest pain.     He presented to the ER after developing left alteral chest pain that radiated to his back after \"doing something stupid, drinking and stuff\". According to the ER note, he was it occurred after taking cocaine. His UDS in the ER was positive for methamphetamines and cocaine. He was not very specific about the type of pain he had and it went away on its own.      His troponin in the ER was 0.037 and 0.040. His EKG showed borderline ST changes.     I reviewed the above HPI and agree with it.  This is a young man who sadly has been using cocaine again recently has been under some stress that his family and then yesterday had some chest pain after using was sharp pain he has a little bit of it today when he takes a breath then he is never had cardiac problems before.  He also smokes.    History reviewed. No pertinent past medical history.    Past Surgical History:   Procedure Laterality Date   • SHOULDER ACROMIOPLASTY         No medications prior to admission.       Current Meds  Scheduled Meds:    sodium chloride 10 mL Intravenous Q12H     Continuous Infusions:    sodium chloride 75 mL/hr Last Rate: 75 mL/hr (20 0506)     PRN Meds:.•  acetaminophen **OR** acetaminophen **OR** acetaminophen  •  bisacodyl  •  calcium carbonate  •  nitroglycerin  •  ondansetron **OR** ondansetron  •  sodium chloride    Allergies as of 2020 - Reviewed 2020   Allergen Reaction Noted   • Anaprox [naproxen] Other (See Comments) 2020   • Ativan [lorazepam]  2017   • Haldol " "[haloperidol]  06/02/2017   • Penicillins  06/02/2017       Social History     Socioeconomic History   • Marital status: Single     Spouse name: Not on file   • Number of children: Not on file   • Years of education: Not on file   • Highest education level: Not on file   Tobacco Use   • Smoking status: Current Every Day Smoker   Substance and Sexual Activity   • Alcohol use: Yes     Comment: socially   • Sexual activity: Yes       History reviewed. No pertinent family history.    REVIEW OF SYSTEMS:   ROS was performed and is negative except as outlined in HPI     REVIEW OF SYSTEMS:   CONSTITUTIONAL: No weight loss, fever, chills, weakness or fatigue.   HEENT: Eyes: No visual loss, blurred vision, double vision or yellow sclerae. Ears, Nose, Throat: No hearing loss, sneezing, congestion, runny nose or sore throat.   SKIN: No rash or itching.     RESPIRATORY: No shortness of breath, hemoptysis, cough or sputum.   GASTROINTESTINAL: No anorexia, nausea, vomiting or diarrhea. No abdominal pain, bright red blood per rectum or melena.  GENITOURINARY: No burning on urination, hematuria or increased frequency.  NEUROLOGICAL: No headache, dizziness, syncope, paralysis, ataxia, numbness or tingling in the extremities. No change in bowel or bladder control.   MUSCULOSKELETAL: No muscle, back pain, joint pain or stiffness.   HEMATOLOGIC: No anemia, bleeding or bruising.   LYMPHATICS: No enlarged nodes. No history of splenectomy.   PSYCHIATRIC: No history of depression, anxiety, hallucinations.   ENDOCRINOLOGIC: No reports of sweating, cold or heat intolerance. No polyuria or polydipsia.        Objective:   Temp:  [97 °F (36.1 °C)-97.8 °F (36.6 °C)] 97.6 °F (36.4 °C)  Heart Rate:  [] 81  Resp:  [16-18] 16  BP: (108-140)/(65-79) 112/65  Body mass index is 26.06 kg/m².  Flowsheet Rows      First Filed Value   Admission Height  170.2 cm (67\") Documented at 01/04/2020 0032   Admission Weight  75.5 kg (166 lb 6.4 oz) Documented " at 01/04/2020 0502        Vitals:    01/04/20 0700   BP: 112/65   Pulse: 81   Resp: 16   Temp: 97.6 °F (36.4 °C)   SpO2: 97%       Head:    Normocephalic, without obvious abnormality, atraumatic   Eyes:            Lids and lashes normal, conjunctivae and sclerae normal, no   icterus, no pallor   Ears:    Ears appear intact with no abnormalities noted   Throat:   No oral lesions, dentition good   Neck:   No adenopathy, supple, trachea midline, no thyromegaly, no   carotid bruit, no JVD   Lungs:     Breath sounds are equal and clear to auscultation    Heart:    Normal S1 and S2, RRR, No M/G/R   Abdomen:     Normal bowel sounds, no masses, no organomegaly, soft        non-tender, non-distended, no guarding   Extremities:   Moves all extremities well, no edema, no cyanosis, no redness   Pulses:   Pulses palpable and equal bilaterally.    Skin:  Psychiatric:   No bleeding, bruising or rash    Awake, alert and oriented x 3, normal mood and affect           EKG  1/4/2020 @ 0305    EKG  1/4/2020 @ 0105      I personally viewed and interpreted the patient's EKG/Telemetry data    Assessment:  Active Hospital Problems    Diagnosis  POA   • Cocaine abuse (CMS/HCC) [F14.10]  Yes      Resolved Hospital Problems   No resolved problems to display.       Plan: He is probably okay I would imagine that his chest pain was microvascular related to spasm hopefully from his cocaine use.  I am going to have him get a stress echo because he ate breakfast this morning.  I am going to have the [facility] people see him as that is the real issue    Alessandro Schafer MD  01/04/20  9:46 AM.

## 2020-01-05 ENCOUNTER — APPOINTMENT (OUTPATIENT)
Dept: GENERAL RADIOLOGY | Facility: HOSPITAL | Age: 55
End: 2020-01-05

## 2020-01-05 VITALS
HEART RATE: 73 BPM | OXYGEN SATURATION: 93 % | DIASTOLIC BLOOD PRESSURE: 86 MMHG | RESPIRATION RATE: 16 BRPM | TEMPERATURE: 97.7 F | SYSTOLIC BLOOD PRESSURE: 123 MMHG | BODY MASS INDEX: 26.24 KG/M2 | WEIGHT: 167.2 LBS | HEIGHT: 67 IN

## 2020-01-05 LAB
ANION GAP SERPL CALCULATED.3IONS-SCNC: 11.2 MMOL/L (ref 5–15)
BASOPHILS # BLD AUTO: 0.04 10*3/MM3 (ref 0–0.2)
BASOPHILS NFR BLD AUTO: 0.5 % (ref 0–1.5)
BUN BLD-MCNC: 17 MG/DL (ref 6–20)
BUN/CREAT SERPL: 18.5 (ref 7–25)
CALCIUM SPEC-SCNC: 7.9 MG/DL (ref 8.6–10.5)
CHLORIDE SERPL-SCNC: 107 MMOL/L (ref 98–107)
CO2 SERPL-SCNC: 20.8 MMOL/L (ref 22–29)
CREAT BLD-MCNC: 0.92 MG/DL (ref 0.76–1.27)
DEPRECATED RDW RBC AUTO: 41.9 FL (ref 37–54)
EOSINOPHIL # BLD AUTO: 0.13 10*3/MM3 (ref 0–0.4)
EOSINOPHIL NFR BLD AUTO: 1.7 % (ref 0.3–6.2)
ERYTHROCYTE [DISTWIDTH] IN BLOOD BY AUTOMATED COUNT: 12.7 % (ref 12.3–15.4)
GFR SERPL CREATININE-BSD FRML MDRD: 103 ML/MIN/1.73
GLUCOSE BLD-MCNC: 87 MG/DL (ref 65–99)
HCT VFR BLD AUTO: 39.8 % (ref 37.5–51)
HGB BLD-MCNC: 14.1 G/DL (ref 13–17.7)
IMM GRANULOCYTES # BLD AUTO: 0.02 10*3/MM3 (ref 0–0.05)
IMM GRANULOCYTES NFR BLD AUTO: 0.3 % (ref 0–0.5)
LYMPHOCYTES # BLD AUTO: 1.88 10*3/MM3 (ref 0.7–3.1)
LYMPHOCYTES NFR BLD AUTO: 25.1 % (ref 19.6–45.3)
MCH RBC QN AUTO: 32.3 PG (ref 26.6–33)
MCHC RBC AUTO-ENTMCNC: 35.4 G/DL (ref 31.5–35.7)
MCV RBC AUTO: 91.1 FL (ref 79–97)
MONOCYTES # BLD AUTO: 0.75 10*3/MM3 (ref 0.1–0.9)
MONOCYTES NFR BLD AUTO: 10 % (ref 5–12)
NEUTROPHILS # BLD AUTO: 4.67 10*3/MM3 (ref 1.7–7)
NEUTROPHILS NFR BLD AUTO: 62.4 % (ref 42.7–76)
NRBC BLD AUTO-RTO: 0 /100 WBC (ref 0–0.2)
PLATELET # BLD AUTO: 293 10*3/MM3 (ref 140–450)
PMV BLD AUTO: 9.9 FL (ref 6–12)
POTASSIUM BLD-SCNC: 4.1 MMOL/L (ref 3.5–5.2)
RBC # BLD AUTO: 4.37 10*6/MM3 (ref 4.14–5.8)
SODIUM BLD-SCNC: 139 MMOL/L (ref 136–145)
WBC NRBC COR # BLD: 7.49 10*3/MM3 (ref 3.4–10.8)

## 2020-01-05 PROCEDURE — 71046 X-RAY EXAM CHEST 2 VIEWS: CPT

## 2020-01-05 PROCEDURE — G0378 HOSPITAL OBSERVATION PER HR: HCPCS

## 2020-01-05 PROCEDURE — 99214 OFFICE O/P EST MOD 30 MIN: CPT | Performed by: INTERNAL MEDICINE

## 2020-01-05 PROCEDURE — 93005 ELECTROCARDIOGRAM TRACING: CPT | Performed by: INTERNAL MEDICINE

## 2020-01-05 PROCEDURE — 93010 ELECTROCARDIOGRAM REPORT: CPT | Performed by: INTERNAL MEDICINE

## 2020-01-05 PROCEDURE — 85025 COMPLETE CBC W/AUTO DIFF WBC: CPT | Performed by: HOSPITALIST

## 2020-01-05 PROCEDURE — 80048 BASIC METABOLIC PNL TOTAL CA: CPT | Performed by: HOSPITALIST

## 2020-01-05 RX ORDER — IBUPROFEN 200 MG
200 TABLET ORAL EVERY 6 HOURS PRN
Start: 2020-01-05

## 2020-01-05 RX ADMIN — ACETAMINOPHEN 650 MG: 325 TABLET, FILM COATED ORAL at 08:25

## 2020-01-05 RX ADMIN — SODIUM CHLORIDE, PRESERVATIVE FREE 10 ML: 5 INJECTION INTRAVENOUS at 08:22

## 2020-01-05 NOTE — PROGRESS NOTES
"Byron Del Cid  1965 54 y.o.  [id number]      Patient Care Team:  System, Provider Not In as PCP - General    CC: Having some pleuritic chest pain, cocaine use, elevated troponin    Interval History: Still having a little bit of left-sided pleuritic chest pain      Objective   Vital Signs  Temp:  [97.7 °F (36.5 °C)-98.2 °F (36.8 °C)] 97.7 °F (36.5 °C)  Heart Rate:  [73-83] 73  Resp:  [16-18] 16  BP: (112-123)/(68-86) 123/86    Intake/Output Summary (Last 24 hours) at 1/5/2020 1536  Last data filed at 1/5/2020 1200  Gross per 24 hour   Intake 2518 ml   Output 1100 ml   Net 1418 ml     Flowsheet Rows      First Filed Value   Admission Height  170.2 cm (67\") Documented at 01/04/2020 0032   Admission Weight  75.5 kg (166 lb 6.4 oz) Documented at 01/04/2020 0502          Physical Exam:   General Appearance:    Alert,oriented, in no acute distress   Lungs:     Clear to auscultation,BS are equal    Heart:    Normal S1 and S2, RRR without murmur, gallop or rub   HEENT:    Sclerae are clear, no JVD or adenopathy   Abdomen:     Normal bowel sounds, soft non-tender, non-distended, no HSM   Extremities:   Moves all extremities well, no edema, no cyanosis, no             Redness, no rash     Medication Review:        nicotine 1 patch Transdermal Q24H   sodium chloride 10 mL Intravenous Q12H       sodium chloride 75 mL/hr Last Rate: 75 mL/hr (01/05/20 0542)         I reviewed the patient's new clinical results.  I personally viewed and interpreted the patient's EKG/Telemetry data    Assessment/Plan  Active Hospital Problems    Diagnosis  POA   • **Cocaine abuse (CMS/[facility]) [F14.10]  Yes   • Elevated troponin [R79.89]  Unknown   • Chest pain [R07.9]  Unknown      Resolved Hospital Problems   No resolved problems to display.       He developed some mild chest discomfort the main issue was he said he had some paranoia came to the hospital they checked his troponin it was barely elevated his EKG was fairly unremarkable his symptoms " were most consistent with pleuritic pain and we did a stress echo on him and honestly he did really well on the stress test no chest pain ECG was okay I think his images are normal I think they have over read it a little bit and: Inferior base issue which is an area that is difficult to read sometimes and I think it is probably normal my suspicion that he has coronary disease is low.  I think his troponin is not representative of acute an acute coronary syndrome and this is not a non-STEMI I think it is related to his cocaine use.  I think he can go home and treat his pleuritic pain with NSAIDs we will see him as needed    Alessandro Schafer MD  01/05/20  3:36 PM

## 2020-01-05 NOTE — NURSING NOTE
Pt anxious about being DC'd. Did not want to wait in room for cab to call unit that they were here. Pt amb down to DC lobby by RN with cab voucher.

## 2020-01-05 NOTE — NURSING NOTE
Appears asleep, did not disturb. Reviewed chart. Access completed bedside evaluation on 1/4/20. History of drug use with cocaine being drug of choice; admitted with c/o chest pain. Patient currently homeless. Patient caught smoking in bathroom after echo yesterday. Access will continue to follow.

## 2020-01-05 NOTE — PLAN OF CARE
Problem: Patient Care Overview  Goal: Plan of Care Review  Outcome: Ongoing (interventions implemented as appropriate)  Flowsheets (Taken 1/5/2020 6579)  Plan of Care Reviewed With: patient  Outcome Summary: Pt slept throughout this shift. Uneventful night.

## 2020-01-05 NOTE — PROGRESS NOTES
"DAILY PROGRESS NOTE  [facility]    Patient Identification:  Name: Byron Del Cid  Age: 54 y.o.  Sex: male  :  1965  MRN: [id number]         Primary Care Physician: System, Provider Not In    Subjective:  Interval History:He complains of cough and left chest wall pain.    Objective:    Scheduled Meds:  nicotine 1 patch Transdermal Q24H   sodium chloride 10 mL Intravenous Q12H     Continuous Infusions:  sodium chloride 75 mL/hr Last Rate: 75 mL/hr (20 05)       Vital signs in last 24 hours:  Temp:  [98 °F (36.7 °C)-98.2 °F (36.8 °C)] 98 °F (36.7 °C)  Heart Rate:  [73-83] 83  Resp:  [16-18] 16  BP: (112-121)/(68-81) 112/75    Intake/Output:    Intake/Output Summary (Last 24 hours) at 2020 1432  Last data filed at 2020 1200  Gross per 24 hour   Intake 2518 ml   Output 1100 ml   Net 1418 ml       Exam:  /75 (BP Location: Right arm, Patient Position: Lying)   Pulse 83   Temp 98 °F (36.7 °C) (Oral)   Resp 16   Ht 170 cm (66.93\")   Wt 75.8 kg (167 lb 3.2 oz)   SpO2 97%   BMI 26.24 kg/m²     General Appearance:    Alert, cooperative, no distress   Head:    Normocephalic, without obvious abnormality, atraumatic   Eyes:       Throat:   Lips, tongue, gums normal   Neck:   Supple, symmetrical, trachea midline, no JVD   Lungs:     Clear to auscultation bilaterally, respirations unlabored   Chest Wall:    No tenderness or deformity    Heart:    Regular rate and rhythm, S1 and S2 normal, no murmur,no  Rub or gallop   Abdomen:     Soft, non-tender, bowel sounds active, no masses, no organomegaly    Extremities:   Extremities normal, atraumatic, no cyanosis or edema   Pulses:      Skin:   Skin is warm and dry,  no rashes or palpable lesions   Neurologic:   no focal deficits noted      Lab Results (last 72 hours)     Procedure Component Value Units Date/Time    Basic Metabolic Panel [[id number]]  (Abnormal) Collected:  20    Specimen:  Blood Updated:  20     " Glucose 87 mg/dL      BUN 17 mg/dL      Creatinine 0.92 mg/dL      Sodium 139 mmol/L      Potassium 4.1 mmol/L      Chloride 107 mmol/L      CO2 20.8 mmol/L      Calcium 7.9 mg/dL      eGFR  African Amer 103 mL/min/1.73      BUN/Creatinine Ratio 18.5     Anion Gap 11.2 mmol/L     Narrative:       GFR Normal >60  Chronic Kidney Disease <60  Kidney Failure <15      CBC & Differential [[id number]] Collected:  01/05/20 0518    Specimen:  Blood Updated:  01/05/20 0641    Narrative:       The following orders were created for panel order CBC & Differential.  Procedure                               Abnormality         Status                     ---------                               -----------         ------                     CBC Auto Differential[[id number]]        Normal              Final result                 Please view results for these tests on the individual orders.    CBC Auto Differential [[id number]]  (Normal) Collected:  01/05/20 0518    Specimen:  Blood Updated:  01/05/20 0641     WBC 7.49 10*3/mm3      RBC 4.37 10*6/mm3      Hemoglobin 14.1 g/dL      Hematocrit 39.8 %      MCV 91.1 fL      MCH 32.3 pg      MCHC 35.4 g/dL      RDW 12.7 %      RDW-SD 41.9 fl      MPV 9.9 fL      Platelets 293 10*3/mm3      Neutrophil % 62.4 %      Lymphocyte % 25.1 %      Monocyte % 10.0 %      Eosinophil % 1.7 %      Basophil % 0.5 %      Immature Grans % 0.3 %      Neutrophils, Absolute 4.67 10*3/mm3      Lymphocytes, Absolute 1.88 10*3/mm3      Monocytes, Absolute 0.75 10*3/mm3      Eosinophils, Absolute 0.13 10*3/mm3      Basophils, Absolute 0.04 10*3/mm3      Immature Grans, Absolute 0.02 10*3/mm3      nRBC 0.0 /100 WBC     Troponin [[id number]]  (Normal) Collected:  01/04/20 2121    Specimen:  Blood Updated:  01/04/20 2155     Troponin T <0.010 ng/mL     Narrative:       Troponin T Reference Range:  <= 0.03 ng/mL-   Negative for AMI  >0.03 ng/mL-     Abnormal for myocardial necrosis.  Clinicians would have to utilize  clinical acumen, EKG, Troponin and serial changes to determine if it is an Acute Myocardial Infarction or myocardial injury due to an underlying chronic condition.     Respiratory Panel, PCR - Swab, Nasopharynx [[id number]]  (Normal) Collected:  01/04/20 1722    Specimen:  Swab from Nasopharynx Updated:  01/04/20 1928     ADENOVIRUS, PCR Not Detected     Coronavirus 229E Not Detected     Coronavirus HKU1 Not Detected     Coronavirus NL63 Not Detected     Coronavirus OC43 Not Detected     Human Metapneumovirus Not Detected     Human Rhinovirus/Enterovirus Not Detected     Influenza B PCR Not Detected     Parainfluenza Virus 1 Not Detected     Parainfluenza Virus 2 Not Detected     Parainfluenza Virus 3 Not Detected     Parainfluenza Virus 4 Not Detected     Bordetella pertussis pcr Not Detected     Influenza A H1 2009 PCR Not Detected     Chlamydophila pneumoniae PCR Not Detected     Mycoplasma pneumo by PCR Not Detected     Influenza A PCR Not Detected     Influenza A H3 Not Detected     Influenza A H1 Not Detected     RSV, PCR Not Detected     Bordetella parapertussis PCR Not Detected    Troponin [[id number]]  (Normal) Collected:  01/04/20 1514    Specimen:  Blood Updated:  01/04/20 1616     Troponin T 0.018 ng/mL     Narrative:       Troponin T Reference Range:  <= 0.03 ng/mL-   Negative for AMI  >0.03 ng/mL-     Abnormal for myocardial necrosis.  Clinicians would have to utilize clinical acumen, EKG, Troponin and serial changes to determine if it is an Acute Myocardial Infarction or myocardial injury due to an underlying chronic condition.     Troponin [[id number]]  (Normal) Collected:  01/04/20 0856    Specimen:  Blood Updated:  01/04/20 0946     Troponin T 0.026 ng/mL     Narrative:       Troponin T Reference Range:  <= 0.03 ng/mL-   Negative for AMI  >0.03 ng/mL-     Abnormal for myocardial necrosis.  Clinicians would have to utilize clinical acumen, EKG, Troponin and serial changes to determine if it is an Acute  Myocardial Infarction or myocardial injury due to an underlying chronic condition.     CBC (No Diff) [[id number]]  (Abnormal) Collected:  01/04/20 0856    Specimen:  Blood Updated:  01/04/20 0925     WBC 8.86 10*3/mm3      RBC 4.09 10*6/mm3      Hemoglobin 13.5 g/dL      Hematocrit 38.5 %      MCV 94.1 fL      MCH 33.0 pg      MCHC 35.1 g/dL      RDW 12.9 %      RDW-SD 43.6 fl      MPV 9.5 fL      Platelets 288 10*3/mm3     Urine Drug Screen - Urine, Clean Catch [[id number]]  (Abnormal) Collected:  01/04/20 0336    Specimen:  Urine, Clean Catch Updated:  01/04/20 0409     Amphet/Methamphet, Screen Positive     Barbiturates Screen, Urine Negative     Benzodiazepine Screen, Urine Negative     Cocaine Screen, Urine Positive     Opiate Screen Negative     THC, Screen, Urine Negative     Methadone Screen, Urine Negative     Oxycodone Screen, Urine Negative    Narrative:       Negative Thresholds For Drugs Screened:     Amphetamines               500 ng/ml   Barbiturates               200 ng/ml   Benzodiazepines            100 ng/ml   Cocaine                    300 ng/ml   Methadone                  300 ng/ml   Opiates                    300 ng/ml   Oxycodone                  100 ng/ml   THC                        50 ng/ml    The Normal Value for all drugs tested is negative. This report includes final unconfirmed screening results to be used for medical treatment purposes only. Unconfirmed results must not be used for non-medical purposes such as employment or legal testing. Clinical consideration should be applied to any drug of abuse test, particulary when unconfirmed results are used.    Troponin [[id number]]  (Abnormal) Collected:  01/04/20 0312    Specimen:  Blood Updated:  01/04/20 0354     Troponin T 0.040 ng/mL     Narrative:       Troponin T Reference Range:  <= 0.03 ng/mL-   Negative for AMI  >0.03 ng/mL-     Abnormal for myocardial necrosis.  Clinicians would have to utilize clinical acumen, EKG, Troponin and  serial changes to determine if it is an Acute Myocardial Infarction or myocardial injury due to an underlying chronic condition.     Basic Metabolic Panel [[id number]]  (Abnormal) Collected:  01/04/20 0312    Specimen:  Blood Updated:  01/04/20 0347     Glucose 83 mg/dL      BUN 11 mg/dL      Creatinine 0.85 mg/dL      Sodium 141 mmol/L      Potassium 4.0 mmol/L      Chloride 104 mmol/L      CO2 23.5 mmol/L      Calcium 8.4 mg/dL      eGFR   Amer 113 mL/min/1.73      BUN/Creatinine Ratio 12.9     Anion Gap 13.5 mmol/L     Narrative:       GFR Normal >60  Chronic Kidney Disease <60  Kidney Failure <15      Troponin [[id number]]  (Abnormal) Collected:  01/04/20 0108    Specimen:  Blood from Arm, Right Updated:  01/04/20 0156     Troponin T 0.037 ng/mL     Narrative:       Troponin T Reference Range:  <= 0.03 ng/mL-   Negative for AMI  >0.03 ng/mL-     Abnormal for myocardial necrosis.  Clinicians would have to utilize clinical acumen, EKG, Troponin and serial changes to determine if it is an Acute Myocardial Infarction or myocardial injury due to an underlying chronic condition.     Ethanol [[id number]]  (Abnormal) Collected:  01/04/20 0108    Specimen:  Blood from Arm, Right Updated:  01/04/20 0154     Ethanol 89 mg/dL      Ethanol % 0.089 %     Comprehensive Metabolic Panel [[id number]] Collected:  01/04/20 0108    Specimen:  Blood from Arm, Right Updated:  01/04/20 0154     Glucose 96 mg/dL      BUN 11 mg/dL      Creatinine 0.98 mg/dL      Sodium 141 mmol/L      Potassium 3.7 mmol/L      Chloride 103 mmol/L      CO2 23.3 mmol/L      Calcium 8.7 mg/dL      Total Protein 7.5 g/dL      Albumin 4.50 g/dL      ALT (SGPT) 40 U/L      AST (SGOT) 29 U/L      Alkaline Phosphatase 83 U/L      Total Bilirubin 0.2 mg/dL      eGFR  African Amer 96 mL/min/1.73      Globulin 3.0 gm/dL      A/G Ratio 1.5 g/dL      BUN/Creatinine Ratio 11.2     Anion Gap 14.7 mmol/L     Narrative:       GFR Normal >60  Chronic Kidney Disease  <60  Kidney Failure <15      CBC & Differential [[id number]] Collected:  01/04/20 0108    Specimen:  Blood from Arm, Right Updated:  01/04/20 0136    Narrative:       The following orders were created for panel order CBC & Differential.  Procedure                               Abnormality         Status                     ---------                               -----------         ------                     CBC Auto Differential[[id number]]        Normal              Final result                 Please view results for these tests on the individual orders.    CBC Auto Differential [[id number]]  (Normal) Collected:  01/04/20 0108    Specimen:  Blood from Arm, Right Updated:  01/04/20 0136     WBC 8.93 10*3/mm3      RBC 4.72 10*6/mm3      Hemoglobin 14.9 g/dL      Hematocrit 44.5 %      MCV 94.3 fL      MCH 31.6 pg      MCHC 33.5 g/dL      RDW 13.0 %      RDW-SD 44.5 fl      MPV 9.7 fL      Platelets 325 10*3/mm3      Neutrophil % 69.5 %      Lymphocyte % 20.2 %      Monocyte % 9.0 %      Eosinophil % 0.6 %      Basophil % 0.4 %      Immature Grans % 0.3 %      Neutrophils, Absolute 6.21 10*3/mm3      Lymphocytes, Absolute 1.80 10*3/mm3      Monocytes, Absolute 0.80 10*3/mm3      Eosinophils, Absolute 0.05 10*3/mm3      Basophils, Absolute 0.04 10*3/mm3      Immature Grans, Absolute 0.03 10*3/mm3      nRBC 0.0 /100 WBC         Data Review:  Results from last 7 days   Lab Units 01/05/20 0518 01/04/20  0312 01/04/20 0108   SODIUM mmol/L 139 141 141   POTASSIUM mmol/L 4.1 4.0 3.7   CHLORIDE mmol/L 107 104 103   CO2 mmol/L 20.8* 23.5 23.3   BUN mg/dL 17 11 11   CREATININE mg/dL 0.92 0.85 0.98   GLUCOSE mg/dL 87 83 96   CALCIUM mg/dL 7.9* 8.4* 8.7     Results from last 7 days   Lab Units 01/05/20 0518 01/04/20  0856 01/04/20 0108   WBC 10*3/mm3 7.49 8.86 8.93   HEMOGLOBIN g/dL 14.1 13.5 14.9   HEMATOCRIT % 39.8 38.5 44.5   PLATELETS 10*3/mm3 293 288 325             Lab Results   Lab Value Date/Time    TROPONINT <0.010  01/04/2020 2121    TROPONINT 0.018 01/04/2020 1514    TROPONINT 0.026 01/04/2020 0856    TROPONINT 0.040 (C) 01/04/2020 0312    TROPONINT 0.037 (C) 01/04/2020 0108         Results from last 7 days   Lab Units 01/04/20  0108   ALK PHOS U/L 83   BILIRUBIN mg/dL 0.2   ALT (SGPT) U/L 40   AST (SGOT) U/L 29             No results found for: POCGLU        History reviewed. No pertinent past medical history.    Assessment:  Active Hospital Problems    Diagnosis  POA   • **Cocaine abuse (CMS/HCC) [F14.10]  Yes   • Elevated troponin [R79.89]  Unknown   • Chest pain [R07.9]  Unknown      Resolved Hospital Problems   No resolved problems to display.       Plan:  Will check CXR. Suspect he has chest wall pain that is musculoskeletal.    Ivan Haji MD  1/5/2020  2:32 PM

## 2020-01-05 NOTE — PROGRESS NOTES
"Continued Stay Note  [facility]     Patient Name: Byron Del Cid  MRN: [id number]  Today's Date: 1/5/2020    Admit Date: 1/4/2020    Discharge Plan     Row Name 01/05/20 1826       Plan    Plan  [facility] via cab, voucher to RN..........Aniya DEVINE     Patient/Family in Agreement with Plan  yes    Plan Comments  Call from patient's RN Etta stating patient ready for DC. Met with patient in room, introduced self and role. Pt. up ad sisi in room and IADL's without difficulty. Pt. states he is currently homeless, has stayed at several different shelters in the past and agreeable to cab to [facility] on [place]. Provided patient with \"Homeless Street Tips\" booklet and RN provided with Cab Voucher. No further needs identified.........Aniya RN         Discharge Codes    No documentation.             Jayla Pimentel, RN    "

## 2020-01-05 NOTE — DISCHARGE SUMMARY
PHYSICIAN DISCHARGE SUMMARY                                                                        [facility]    Patient Identification:  Name: Byron Del Cid  Age: 54 y.o.  Sex: male  :  1965  MRN: [id number]  Primary Care Physician: System, Provider Not In    Admit date: 2020  Discharge date and time:2020  Discharged Condition: good    Discharge Diagnoses:  Active Hospital Problems    Diagnosis  POA   • **Cocaine abuse (CMS/[facility]) [F14.10]  Yes   • Elevated troponin [R79.89]  Unknown   • Chest pain [R07.9]  Unknown      Resolved Hospital Problems   No resolved problems to display.          PMHX: History reviewed. No pertinent past medical history.  PSHX:   Past Surgical History:   Procedure Laterality Date   • SHOULDER ACROMIOPLASTY         Hospital Course: Byron Del Cid  is a 54-year-old -American male with history of drug abuse who was admitted with history of having some chest pain. He had been coughing some recently and is been a little short of air. He been using some cocaine again. The patient was evaluated in the ER and admitted for further evaluation treatment.          The patient was admitted to the hospital and seen by cardiology.  Patient had cardiac work-up and was felt to have noncardiac chest pain.  His pain seem more musculoskeletal and aggravated by his cough and congestion.  He did have respiratory PCR panel which was negative for any viral pathogens although his symptoms sounded like viral respiratory illness.  Chest x-ray did not show any infiltrates and his white count was normal.  He did not have any fever.  It was recommended he continue with some Advil as needed for his chest pain and advised to stop using cocaine and go to treatment program and stay sober.  He will follow-up with his primary care physician.    Consults:     Consults     Date and Time Order Name Status Description    2020 0246  Inpatient Cardiology Consult Completed     1/4/2020 0220 [facility] (on-call MD unless specified) Details Completed     1/4/2020 0206 [facility] (on-call MD unless specified) Completed         Results from last 7 days   Lab Units 01/05/20  0518   WBC 10*3/mm3 7.49   HEMOGLOBIN g/dL 14.1   HEMATOCRIT % 39.8   PLATELETS 10*3/mm3 293     Results from last 7 days   Lab Units 01/05/20  0518   SODIUM mmol/L 139   POTASSIUM mmol/L 4.1   CHLORIDE mmol/L 107   CO2 mmol/L 20.8*   BUN mg/dL 17   CREATININE mg/dL 0.92   GLUCOSE mg/dL 87   CALCIUM mg/dL 7.9*     Significant Diagnostic Studies:   WBC   Date Value Ref Range Status   01/05/2020 7.49 3.40 - 10.80 10*3/mm3 Final     Hemoglobin   Date Value Ref Range Status   01/05/2020 14.1 13.0 - 17.7 g/dL Final     Hematocrit   Date Value Ref Range Status   01/05/2020 39.8 37.5 - 51.0 % Final     Platelets   Date Value Ref Range Status   01/05/2020 293 140 - 450 10*3/mm3 Final     Sodium   Date Value Ref Range Status   01/05/2020 139 136 - 145 mmol/L Final     Potassium   Date Value Ref Range Status   01/05/2020 4.1 3.5 - 5.2 mmol/L Final     Chloride   Date Value Ref Range Status   01/05/2020 107 98 - 107 mmol/L Final     CO2   Date Value Ref Range Status   01/05/2020 20.8 (L) 22.0 - 29.0 mmol/L Final     BUN   Date Value Ref Range Status   01/05/2020 17 6 - 20 mg/dL Final     Creatinine   Date Value Ref Range Status   01/05/2020 0.92 0.76 - 1.27 mg/dL Final     Glucose   Date Value Ref Range Status   01/05/2020 87 65 - 99 mg/dL Final     Calcium   Date Value Ref Range Status   01/05/2020 7.9 (L) 8.6 - 10.5 mg/dL Final     AST (SGOT)   Date Value Ref Range Status   01/04/2020 29 1 - 40 U/L Final     ALT (SGPT)   Date Value Ref Range Status   01/04/2020 40 1 - 41 U/L Final     Alkaline Phosphatase   Date Value Ref Range Status   01/04/2020 83 39 - 117 U/L Final     No results found for: APTT, INR  No results found for: COLORU, CLARITYU, SPECGRAV, PHUR, PROTEINUR, GLUCOSEU, KETONESU, BLOODU,  NITRITE, LEUKOCYTESUR, BILIRUBINUR, UROBILINOGEN, RBCUA, WBCUA, BACTERIA, UACOMMENT  Troponin T   Date Value Ref Range Status   01/04/2020 <0.010 0.000 - 0.030 ng/mL Final     No components found for: HGBA1C;2  No components found for: TSH;2  Imaging Results (All)     Procedure Component Value Units Date/Time    XR Chest PA & Lateral [[id number]] Resulted:  01/05/20 1521     Updated:  01/05/20 1522    XR Chest 2 View [[id number]] Collected:  01/04/20 0146     Updated:  01/04/20 0152    Narrative:       PA AND LATERAL CHEST RADIOGRAPH     HISTORY: Left-sided chest pain. Cough.     COMPARISON: None available.     FINDINGS:  Heart size is within normal limits. Mild diffuse interstitial prominence  is noted. No pneumothorax or pleural effusion is seen.       Impression:       Mild diffuse interstitial prominence. Pneumonia is not excluded.  Short-term follow-up exam is recommended.     This report was finalized on 1/4/2020 1:48 AM by Dr. Selam Douglass M.D.           Lab Results (last 7 days)     Procedure Component Value Units Date/Time    Basic Metabolic Panel [[id number]]  (Abnormal) Collected:  01/05/20 0518    Specimen:  Blood Updated:  01/05/20 0711     Glucose 87 mg/dL      BUN 17 mg/dL      Creatinine 0.92 mg/dL      Sodium 139 mmol/L      Potassium 4.1 mmol/L      Chloride 107 mmol/L      CO2 20.8 mmol/L      Calcium 7.9 mg/dL      eGFR  African Amer 103 mL/min/1.73      BUN/Creatinine Ratio 18.5     Anion Gap 11.2 mmol/L     Narrative:       GFR Normal >60  Chronic Kidney Disease <60  Kidney Failure <15      CBC & Differential [[id number]] Collected:  01/05/20 0518    Specimen:  Blood Updated:  01/05/20 0641    Narrative:       The following orders were created for panel order CBC & Differential.  Procedure                               Abnormality         Status                     ---------                               -----------         ------                     CBC Auto Differential[[id number]]         Normal              Final result                 Please view results for these tests on the individual orders.    CBC Auto Differential [[id number]]  (Normal) Collected:  01/05/20 0518    Specimen:  Blood Updated:  01/05/20 0641     WBC 7.49 10*3/mm3      RBC 4.37 10*6/mm3      Hemoglobin 14.1 g/dL      Hematocrit 39.8 %      MCV 91.1 fL      MCH 32.3 pg      MCHC 35.4 g/dL      RDW 12.7 %      RDW-SD 41.9 fl      MPV 9.9 fL      Platelets 293 10*3/mm3      Neutrophil % 62.4 %      Lymphocyte % 25.1 %      Monocyte % 10.0 %      Eosinophil % 1.7 %      Basophil % 0.5 %      Immature Grans % 0.3 %      Neutrophils, Absolute 4.67 10*3/mm3      Lymphocytes, Absolute 1.88 10*3/mm3      Monocytes, Absolute 0.75 10*3/mm3      Eosinophils, Absolute 0.13 10*3/mm3      Basophils, Absolute 0.04 10*3/mm3      Immature Grans, Absolute 0.02 10*3/mm3      nRBC 0.0 /100 WBC     Troponin [[id number]]  (Normal) Collected:  01/04/20 2121    Specimen:  Blood Updated:  01/04/20 2155     Troponin T <0.010 ng/mL     Narrative:       Troponin T Reference Range:  <= 0.03 ng/mL-   Negative for AMI  >0.03 ng/mL-     Abnormal for myocardial necrosis.  Clinicians would have to utilize clinical acumen, EKG, Troponin and serial changes to determine if it is an Acute Myocardial Infarction or myocardial injury due to an underlying chronic condition.     Respiratory Panel, PCR - Swab, Nasopharynx [[id number]]  (Normal) Collected:  01/04/20 1722    Specimen:  Swab from Nasopharynx Updated:  01/04/20 1928     ADENOVIRUS, PCR Not Detected     Coronavirus 229E Not Detected     Coronavirus HKU1 Not Detected     Coronavirus NL63 Not Detected     Coronavirus OC43 Not Detected     Human Metapneumovirus Not Detected     Human Rhinovirus/Enterovirus Not Detected     Influenza B PCR Not Detected     Parainfluenza Virus 1 Not Detected     Parainfluenza Virus 2 Not Detected     Parainfluenza Virus 3 Not Detected     Parainfluenza Virus 4 Not Detected      Bordetella pertussis pcr Not Detected     Influenza A H1 2009 PCR Not Detected     Chlamydophila pneumoniae PCR Not Detected     Mycoplasma pneumo by PCR Not Detected     Influenza A PCR Not Detected     Influenza A H3 Not Detected     Influenza A H1 Not Detected     RSV, PCR Not Detected     Bordetella parapertussis PCR Not Detected    Troponin [[id number]]  (Normal) Collected:  01/04/20 1514    Specimen:  Blood Updated:  01/04/20 1616     Troponin T 0.018 ng/mL     Narrative:       Troponin T Reference Range:  <= 0.03 ng/mL-   Negative for AMI  >0.03 ng/mL-     Abnormal for myocardial necrosis.  Clinicians would have to utilize clinical acumen, EKG, Troponin and serial changes to determine if it is an Acute Myocardial Infarction or myocardial injury due to an underlying chronic condition.     Troponin [[id number]]  (Normal) Collected:  01/04/20 0856    Specimen:  Blood Updated:  01/04/20 0946     Troponin T 0.026 ng/mL     Narrative:       Troponin T Reference Range:  <= 0.03 ng/mL-   Negative for AMI  >0.03 ng/mL-     Abnormal for myocardial necrosis.  Clinicians would have to utilize clinical acumen, EKG, Troponin and serial changes to determine if it is an Acute Myocardial Infarction or myocardial injury due to an underlying chronic condition.     CBC (No Diff) [[id number]]  (Abnormal) Collected:  01/04/20 0856    Specimen:  Blood Updated:  01/04/20 0925     WBC 8.86 10*3/mm3      RBC 4.09 10*6/mm3      Hemoglobin 13.5 g/dL      Hematocrit 38.5 %      MCV 94.1 fL      MCH 33.0 pg      MCHC 35.1 g/dL      RDW 12.9 %      RDW-SD 43.6 fl      MPV 9.5 fL      Platelets 288 10*3/mm3     Urine Drug Screen - Urine, Clean Catch [[id number]]  (Abnormal) Collected:  01/04/20 0336    Specimen:  Urine, Clean Catch Updated:  01/04/20 0409     Amphet/Methamphet, Screen Positive     Barbiturates Screen, Urine Negative     Benzodiazepine Screen, Urine Negative     Cocaine Screen, Urine Positive     Opiate Screen Negative     THC,  Screen, Urine Negative     Methadone Screen, Urine Negative     Oxycodone Screen, Urine Negative    Narrative:       Negative Thresholds For Drugs Screened:     Amphetamines               500 ng/ml   Barbiturates               200 ng/ml   Benzodiazepines            100 ng/ml   Cocaine                    300 ng/ml   Methadone                  300 ng/ml   Opiates                    300 ng/ml   Oxycodone                  100 ng/ml   THC                        50 ng/ml    The Normal Value for all drugs tested is negative. This report includes final unconfirmed screening results to be used for medical treatment purposes only. Unconfirmed results must not be used for non-medical purposes such as employment or legal testing. Clinical consideration should be applied to any drug of abuse test, particulary when unconfirmed results are used.    Troponin [[id number]]  (Abnormal) Collected:  01/04/20 0312    Specimen:  Blood Updated:  01/04/20 0354     Troponin T 0.040 ng/mL     Narrative:       Troponin T Reference Range:  <= 0.03 ng/mL-   Negative for AMI  >0.03 ng/mL-     Abnormal for myocardial necrosis.  Clinicians would have to utilize clinical acumen, EKG, Troponin and serial changes to determine if it is an Acute Myocardial Infarction or myocardial injury due to an underlying chronic condition.     Basic Metabolic Panel [[id number]]  (Abnormal) Collected:  01/04/20 0312    Specimen:  Blood Updated:  01/04/20 0347     Glucose 83 mg/dL      BUN 11 mg/dL      Creatinine 0.85 mg/dL      Sodium 141 mmol/L      Potassium 4.0 mmol/L      Chloride 104 mmol/L      CO2 23.5 mmol/L      Calcium 8.4 mg/dL      eGFR   Amer 113 mL/min/1.73      BUN/Creatinine Ratio 12.9     Anion Gap 13.5 mmol/L     Narrative:       GFR Normal >60  Chronic Kidney Disease <60  Kidney Failure <15      Troponin [[id number]]  (Abnormal) Collected:  01/04/20 0108    Specimen:  Blood from Arm, Right Updated:  01/04/20 0156     Troponin T 0.037 ng/mL      Narrative:       Troponin T Reference Range:  <= 0.03 ng/mL-   Negative for AMI  >0.03 ng/mL-     Abnormal for myocardial necrosis.  Clinicians would have to utilize clinical acumen, EKG, Troponin and serial changes to determine if it is an Acute Myocardial Infarction or myocardial injury due to an underlying chronic condition.     Ethanol [[id number]]  (Abnormal) Collected:  01/04/20 0108    Specimen:  Blood from Arm, Right Updated:  01/04/20 0154     Ethanol 89 mg/dL      Ethanol % 0.089 %     Comprehensive Metabolic Panel [[id number]] Collected:  01/04/20 0108    Specimen:  Blood from Arm, Right Updated:  01/04/20 0154     Glucose 96 mg/dL      BUN 11 mg/dL      Creatinine 0.98 mg/dL      Sodium 141 mmol/L      Potassium 3.7 mmol/L      Chloride 103 mmol/L      CO2 23.3 mmol/L      Calcium 8.7 mg/dL      Total Protein 7.5 g/dL      Albumin 4.50 g/dL      ALT (SGPT) 40 U/L      AST (SGOT) 29 U/L      Alkaline Phosphatase 83 U/L      Total Bilirubin 0.2 mg/dL      eGFR  African Amer 96 mL/min/1.73      Globulin 3.0 gm/dL      A/G Ratio 1.5 g/dL      BUN/Creatinine Ratio 11.2     Anion Gap 14.7 mmol/L     Narrative:       GFR Normal >60  Chronic Kidney Disease <60  Kidney Failure <15      CBC & Differential [[id number]] Collected:  01/04/20 0108    Specimen:  Blood from Arm, Right Updated:  01/04/20 0136    Narrative:       The following orders were created for panel order CBC & Differential.  Procedure                               Abnormality         Status                     ---------                               -----------         ------                     CBC Auto Differential[[id number]]        Normal              Final result                 Please view results for these tests on the individual orders.    CBC Auto Differential [[id number]]  (Normal) Collected:  01/04/20 0108    Specimen:  Blood from Arm, Right Updated:  01/04/20 0136     WBC 8.93 10*3/mm3      RBC 4.72 10*6/mm3      Hemoglobin 14.9 g/dL       "Hematocrit 44.5 %      MCV 94.3 fL      MCH 31.6 pg      MCHC 33.5 g/dL      RDW 13.0 %      RDW-SD 44.5 fl      MPV 9.7 fL      Platelets 325 10*3/mm3      Neutrophil % 69.5 %      Lymphocyte % 20.2 %      Monocyte % 9.0 %      Eosinophil % 0.6 %      Basophil % 0.4 %      Immature Grans % 0.3 %      Neutrophils, Absolute 6.21 10*3/mm3      Lymphocytes, Absolute 1.80 10*3/mm3      Monocytes, Absolute 0.80 10*3/mm3      Eosinophils, Absolute 0.05 10*3/mm3      Basophils, Absolute 0.04 10*3/mm3      Immature Grans, Absolute 0.03 10*3/mm3      nRBC 0.0 /100 WBC         /86 (BP Location: Right arm, Patient Position: Lying)   Pulse 73   Temp 97.7 °F (36.5 °C) (Oral)   Resp 16   Ht 170 cm (66.93\")   Wt 75.8 kg (167 lb 3.2 oz)   SpO2 93%   BMI 26.24 kg/m²     Discharge Exam:  General Appearance:    Alert, cooperative, no distress                          Head:    Normocephalic, without obvious abnormality, atraumatic                          Eyes:                            Throat:   Lips, tongue, gums normal                          Neck:   Supple, symmetrical, trachea midline, no JVD                        Lungs:     Clear to auscultation bilaterally, respirations unlabored                Chest Wall:    No tenderness or deformity                        Heart:    Regular rate and rhythm, S1 and S2 normal, no murmur,no  Rub or gallop                  Abdomen:     Soft, non-tender, bowel sounds active, no masses, no organomegaly                  Extremities:   Extremities normal, atraumatic, no cyanosis or edema                             Skin:   Skin is warm and dry,  no rashes or palpable lesions                  Neurologic:   no focal deficits noted     Disposition:  Home    Patient Instructions:      Discharge Medications      New Medications      Instructions Start Date   ibuprofen 200 MG tablet  Commonly known as:  ADVIL   200 mg, Oral, Every 6 Hours PRN           No future appointments.  Follow-up " Information     System, Provider Not In .    Contact information:  [facility] [id number]                 Discharge Order (From admission, onward)     Start     Ordered    01/05/20 1830  Discharge patient  Once     Expected Discharge Date:  01/05/20    Discharge Disposition:  Home or Self Care    Physician of Record for Attribution - Please select from Treatment Team:  IVAN HAJI [[id number]]    Review needed by CMO to determine Physician of Record:  No       Question Answer Comment   Physician of Record for Attribution - Please select from Treatment Team IVAN HAJI    Review needed by CMO to determine Physician of Record No        01/05/20 1833                Total time spent discharging patient including evaluation,post hospitalization follow up,  medication and post hospitalization instructions and education total time exceeds 30 minutes.    Signed:  Ivan Haji MD  1/5/2020  6:36 PM

## 2020-01-06 NOTE — PROGRESS NOTES
Case Management Discharge Note      Final Note: Kuldip.         Destination      No service has been selected for the patient.      Durable Medical Equipment      No service has been selected for the patient.      Dialysis/Infusion      No service has been selected for the patient.      Home Medical Care      No service has been selected for the patient.      Therapy      No service has been selected for the patient.      Community Resources      No service has been selected for the patient.        Transportation Services  Taxi: [employer]    Final Discharge Disposition Code: 01 - home or self-care

## 2022-10-25 ENCOUNTER — HOSPITAL ENCOUNTER (EMERGENCY)
Facility: HOSPITAL | Age: 57
Discharge: HOME | End: 2022-10-25
Attending: EMERGENCY MEDICINE | Admitting: EMERGENCY MEDICINE

## 2022-10-25 ENCOUNTER — APPOINTMENT (OUTPATIENT)
Dept: GENERAL RADIOLOGY | Facility: HOSPITAL | Age: 57
End: 2022-10-25

## 2022-10-25 VITALS
DIASTOLIC BLOOD PRESSURE: 68 MMHG | SYSTOLIC BLOOD PRESSURE: 111 MMHG | WEIGHT: 170 LBS | BODY MASS INDEX: 26.68 KG/M2 | TEMPERATURE: 98.6 F | HEIGHT: 67 IN | RESPIRATION RATE: 17 BRPM | OXYGEN SATURATION: 94 % | HEART RATE: 73 BPM

## 2022-10-25 DIAGNOSIS — J06.9 VIRAL URI WITH COUGH: Primary | ICD-10-CM

## 2022-10-25 PROCEDURE — 71045 X-RAY EXAM CHEST 1 VIEW: CPT

## 2022-10-25 PROCEDURE — 0202U NFCT DS 22 TRGT SARS-COV-2: CPT | Performed by: PHYSICIAN ASSISTANT

## 2022-10-25 PROCEDURE — 99284 EMERGENCY DEPT VISIT MOD MDM: CPT

## 2022-10-25 RX ORDER — ACETAMINOPHEN 500 MG
1000 TABLET ORAL ONCE
Status: COMPLETED | OUTPATIENT
Start: 2022-10-25 | End: 2022-10-25

## 2022-10-25 RX ADMIN — ACETAMINOPHEN 1000 MG: 500 TABLET ORAL at 10:01

## 2022-10-25 NOTE — ED TRIAGE NOTES
Pt to ER from home via PV with c/o weakness, cough, headache. Pt reports sore throat.  Pt states symptoms began yesterday. Pt also states he wants to be tested for COVID because he feels he may have possibly been exposed at work.      Pt masked in triage, staff in appropriate ppe.

## 2022-10-25 NOTE — DISCHARGE INSTRUCTIONS
Take OTC cold medicines for your symptoms as needed.  Tylenol/ibuprofen for body aches and fever  Recheck with you primary care doctor as needed, return to the ER for shortness of breath or as needed.

## 2022-10-25 NOTE — ED NOTES
Pt stated a feels like he has a head cold, head pressure that is all over. Pt stated infrequent cough that is productive. Pt denies chest pain, N/V/D.     Pt reports generalized weakness. Pt stated he has been working 7 days a week and feels exhausted.

## 2022-10-25 NOTE — ED PROVIDER NOTES
EMERGENCY DEPARTMENT ENCOUNTER    Room Number:  07/07  Date seen:  10/25/2022  Time seen: 10:28 EDT  PCP: System, Provider Not In  Historian: patient      HPI:  Chief Complaint: cough, sore throat, chills    A complete HPI/ROS/PMH/PSH/SH/FH are unobtainable due to: none    Context: Byron Del Cid is a 56 y.o. male who presents to the ED for evaluation of 1 day history of constant mild to moderate upper respiratory symptoms including cough, sore throat, headache, body aches and low-grade fever.  Got a little better after taking Tylenol Cold and sinus last night, has not taken anything since.  Nothing really makes it worse.  He works in a factory, no known specific exposures but has concerns about being exposed to COVID-19.  He is fully vaccinated.  He denies any chest pain shortness of breath nausea vomiting diarrhea.  Presented with a low-grade fever, temp of 100.3.  Denies any history of lung disease, he is a smoker.        PAST MEDICAL HISTORY  Active Ambulatory Problems     Diagnosis Date Noted   • Cocaine abuse (HCC) 01/04/2020   • Elevated troponin 01/04/2020   • Chest pain 01/04/2020     Resolved Ambulatory Problems     Diagnosis Date Noted   • No Resolved Ambulatory Problems     Past Medical History:   Diagnosis Date   • Chronic back pain          PAST SURGICAL HISTORY  Past Surgical History:   Procedure Laterality Date   • SHOULDER ACROMIOPLASTY           FAMILY HISTORY  History reviewed. No pertinent family history.      SOCIAL HISTORY  Social History     Socioeconomic History   • Marital status: Single   Tobacco Use   • Smoking status: Every Day   Substance and Sexual Activity   • Alcohol use: Yes     Comment: socially   • Drug use: Yes     Types: Cocaine(coke)   • Sexual activity: Yes         ALLERGIES  Anaprox [naproxen], Ativan [lorazepam], Haldol [haloperidol], and Penicillins        REVIEW OF SYSTEMS  Review of Systems     All systems reviewed and negative except for those discussed in HPI.       PHYSICAL  EXAM  ED Triage Vitals   Temp Heart Rate Resp BP SpO2   10/25/22 0506 10/25/22 0506 10/25/22 0506 10/25/22 0506 10/25/22 0506   100.3 °F (37.9 °C) 113 20 132/70 97 %      Temp src Heart Rate Source Patient Position BP Location FiO2 (%)   10/25/22 0751 10/25/22 0751 10/25/22 0751 10/25/22 0936 --   Tympanic Monitor Sitting Right arm          GENERAL: not distressed  HENT: atraumatic.  TMs normal, oropharynx clear moist with no erythema edema exudate or lesions  EYES: no scleral icterus, PERRL, extraocular movements intact   CV: regular rhythm, regular rate  RESPIRATORY: normal effort CTA B  ABDOMEN: soft, nontender nondistended  MUSCULOSKELETAL: no deformity  NEURO: alert, moves all extremities, follows commands  SKIN: warm, dry    Vital signs and nursing notes reviewed.          LAB RESULTS  Recent Results (from the past 24 hour(s))   Respiratory Panel PCR w/COVID-19(SARS-CoV-2) LARISA/JAMES/VILMA/PAD/[facility]/MAD/NATHALY In-House, NP Swab in UTM/VTM, 3-4 HR TAT - Swab, Nasopharynx    Collection Time: 10/25/22 10:00 AM    Specimen: Nasopharynx; Swab   Result Value Ref Range    ADENOVIRUS, PCR Not Detected Not Detected    Coronavirus 229E Not Detected Not Detected    Coronavirus HKU1 Not Detected Not Detected    Coronavirus NL63 Not Detected Not Detected    Coronavirus OC43 Not Detected Not Detected    COVID19 Not Detected Not Detected - Ref. Range    Human Metapneumovirus Not Detected Not Detected    Human Rhinovirus/Enterovirus Not Detected Not Detected    Influenza A PCR Not Detected Not Detected    Influenza B PCR Not Detected Not Detected    Parainfluenza Virus 1 Not Detected Not Detected    Parainfluenza Virus 2 Not Detected Not Detected    Parainfluenza Virus 3 Not Detected Not Detected    Parainfluenza Virus 4 Not Detected Not Detected    RSV, PCR Not Detected Not Detected    Bordetella pertussis pcr Not Detected Not Detected    Bordetella parapertussis PCR Not Detected Not Detected    Chlamydophila pneumoniae PCR Not  Detected Not Detected    Mycoplasma pneumo by PCR Not Detected Not Detected       Ordered the above labs and independently reviewed the results.        RADIOLOGY  XR Chest 1 View   Final Result   Shallow inspiratory effort with bibasilar atelectasis       This report was finalized on 10/25/2022 10:39 AM by Dr. Rex Valenzuela M.D.              I ordered the above noted radiological studies. Reviewed by me and discussed with radiologist.  See dictation for official radiology interpretation.    PROCEDURES  Procedures        MEDICATIONS GIVEN IN ER  Medications   acetaminophen (TYLENOL) tablet 1,000 mg (1,000 mg Oral Given 10/25/22 1001)             PROGRESS AND CONSULTS    DDX includes but not limited to pneumonia, allergic rhinitis, viral URI, bronchitis, COPD    ED Course as of 10/25/22 1137   Tue Oct 25, 2022   1028 My interpretation of the chest x-ray is no acute infiltrate, no cardiomegaly.  Hardware in the left clavicle appears appropriate [KA]   1133 COVID19: Not Detected [KA]   1134 RSV, PCR: Not Detected [KA]   1134 I reassessed the patient, he is resting comfortably.  Blood pressure normal, no hypoxia, chest x-ray clear.  He does have symptoms of a viral upper respiratory infection which I have recommended symptomatic treatment for.  I provided him a work note.  Counseled him to return to the ER for shortness of breath or worsening and he is agreeable with this plan is stable for discharge. [KA]      ED Course User Index  [KA] Rubina Rod PA             Patient was placed in face mask in first look. Patient was wearing facemask each time I entered the room and throughout our encounter. I wore protective equipment throughout this patient encounter including a face mask, eye shield and gloves. Hand hygiene was performed before donning protective equipment and after removal when leaving the room.        DIAGNOSIS  Final diagnoses:   Viral URI with cough         Follow Up:  No follow-up provider  specified. Common referral for PCP sent    RX:     Medication List      No changes were made to your prescriptions during this visit.           Latest Documented Vital Signs:  As of 11:37 EDT  BP- 119/72 HR- 87 Temp- 98.6 °F (37 °C) (Oral) O2 sat- 95%       Rubina Rod PA  10/25/22 1682

## 2022-10-27 ENCOUNTER — HOSPITAL ENCOUNTER (EMERGENCY)
Facility: HOSPITAL | Age: 57
Discharge: HOME | End: 2022-10-27
Attending: EMERGENCY MEDICINE | Admitting: EMERGENCY MEDICINE

## 2022-10-27 ENCOUNTER — APPOINTMENT (OUTPATIENT)
Dept: GENERAL RADIOLOGY | Facility: HOSPITAL | Age: 57
End: 2022-10-27

## 2022-10-27 VITALS
OXYGEN SATURATION: 97 % | DIASTOLIC BLOOD PRESSURE: 85 MMHG | WEIGHT: 170 LBS | SYSTOLIC BLOOD PRESSURE: 126 MMHG | HEIGHT: 67 IN | RESPIRATION RATE: 16 BRPM | BODY MASS INDEX: 26.68 KG/M2 | TEMPERATURE: 97 F | HEART RATE: 63 BPM

## 2022-10-27 DIAGNOSIS — Z72.0 TOBACCO ABUSE: ICD-10-CM

## 2022-10-27 DIAGNOSIS — J40 BRONCHITIS: ICD-10-CM

## 2022-10-27 DIAGNOSIS — R07.81 PLEURITIC CHEST PAIN: ICD-10-CM

## 2022-10-27 DIAGNOSIS — J44.1 COPD EXACERBATION: Primary | ICD-10-CM

## 2022-10-27 LAB
ALBUMIN SERPL-MCNC: 3.5 G/DL (ref 3.5–5.2)
ALBUMIN/GLOB SERPL: 1.3 G/DL
ALP SERPL-CCNC: 81 U/L (ref 39–117)
ALT SERPL W P-5'-P-CCNC: 17 U/L (ref 1–41)
ANION GAP SERPL CALCULATED.3IONS-SCNC: 9 MMOL/L (ref 5–15)
AST SERPL-CCNC: 16 U/L (ref 1–40)
B PARAPERT DNA SPEC QL NAA+PROBE: NOT DETECTED
B PERT DNA SPEC QL NAA+PROBE: NOT DETECTED
BASOPHILS # BLD AUTO: 0.04 10*3/MM3 (ref 0–0.2)
BASOPHILS NFR BLD AUTO: 0.4 % (ref 0–1.5)
BILIRUB SERPL-MCNC: 0.2 MG/DL (ref 0–1.2)
BUN SERPL-MCNC: 12 MG/DL (ref 6–20)
BUN/CREAT SERPL: 14.1 (ref 7–25)
C PNEUM DNA NPH QL NAA+NON-PROBE: NOT DETECTED
CALCIUM SPEC-SCNC: 8.9 MG/DL (ref 8.6–10.5)
CHLORIDE SERPL-SCNC: 108 MMOL/L (ref 98–107)
CO2 SERPL-SCNC: 24 MMOL/L (ref 22–29)
CREAT SERPL-MCNC: 0.85 MG/DL (ref 0.76–1.27)
D DIMER PPP FEU-MCNC: <0.27 MCGFEU/ML (ref 0–0.49)
D-LACTATE SERPL-SCNC: 0.8 MMOL/L (ref 0.5–2)
DEPRECATED RDW RBC AUTO: 41.3 FL (ref 37–54)
EGFRCR SERPLBLD CKD-EPI 2021: 100.7 ML/MIN/1.73
EOSINOPHIL # BLD AUTO: 0.16 10*3/MM3 (ref 0–0.4)
EOSINOPHIL NFR BLD AUTO: 1.4 % (ref 0.3–6.2)
ERYTHROCYTE [DISTWIDTH] IN BLOOD BY AUTOMATED COUNT: 12.5 % (ref 12.3–15.4)
ERYTHROCYTE [SEDIMENTATION RATE] IN BLOOD: 28 MM/HR (ref 0–20)
FLUAV SUBTYP SPEC NAA+PROBE: NOT DETECTED
FLUBV RNA ISLT QL NAA+PROBE: NOT DETECTED
GLOBULIN UR ELPH-MCNC: 2.7 GM/DL
GLUCOSE SERPL-MCNC: 91 MG/DL (ref 65–99)
HADV DNA SPEC NAA+PROBE: NOT DETECTED
HCOV 229E RNA SPEC QL NAA+PROBE: NOT DETECTED
HCOV HKU1 RNA SPEC QL NAA+PROBE: NOT DETECTED
HCOV NL63 RNA SPEC QL NAA+PROBE: NOT DETECTED
HCOV OC43 RNA SPEC QL NAA+PROBE: NOT DETECTED
HCT VFR BLD AUTO: 41.2 % (ref 37.5–51)
HGB BLD-MCNC: 14.5 G/DL (ref 13–17.7)
HMPV RNA NPH QL NAA+NON-PROBE: NOT DETECTED
HPIV1 RNA ISLT QL NAA+PROBE: NOT DETECTED
HPIV2 RNA SPEC QL NAA+PROBE: NOT DETECTED
HPIV3 RNA NPH QL NAA+PROBE: NOT DETECTED
HPIV4 P GENE NPH QL NAA+PROBE: NOT DETECTED
IMM GRANULOCYTES # BLD AUTO: 0.04 10*3/MM3 (ref 0–0.05)
IMM GRANULOCYTES NFR BLD AUTO: 0.4 % (ref 0–0.5)
LYMPHOCYTES # BLD AUTO: 1.89 10*3/MM3 (ref 0.7–3.1)
LYMPHOCYTES NFR BLD AUTO: 16.8 % (ref 19.6–45.3)
M PNEUMO IGG SER IA-ACNC: NOT DETECTED
MAGNESIUM SERPL-MCNC: 2.1 MG/DL (ref 1.6–2.6)
MCH RBC QN AUTO: 31.7 PG (ref 26.6–33)
MCHC RBC AUTO-ENTMCNC: 35.2 G/DL (ref 31.5–35.7)
MCV RBC AUTO: 90.2 FL (ref 79–97)
MONOCYTES # BLD AUTO: 1.1 10*3/MM3 (ref 0.1–0.9)
MONOCYTES NFR BLD AUTO: 9.8 % (ref 5–12)
NEUTROPHILS NFR BLD AUTO: 71.2 % (ref 42.7–76)
NEUTROPHILS NFR BLD AUTO: 8.04 10*3/MM3 (ref 1.7–7)
NRBC BLD AUTO-RTO: 0 /100 WBC (ref 0–0.2)
NT-PROBNP SERPL-MCNC: 62.5 PG/ML (ref 0–900)
PLATELET # BLD AUTO: 274 10*3/MM3 (ref 140–450)
PMV BLD AUTO: 9.9 FL (ref 6–12)
POTASSIUM SERPL-SCNC: 4.2 MMOL/L (ref 3.5–5.2)
PROCALCITONIN SERPL-MCNC: 0.12 NG/ML (ref 0–0.25)
PROT SERPL-MCNC: 6.2 G/DL (ref 6–8.5)
QT INTERVAL: 392 MS
RBC # BLD AUTO: 4.57 10*6/MM3 (ref 4.14–5.8)
RHINOVIRUS RNA SPEC NAA+PROBE: NOT DETECTED
RSV RNA NPH QL NAA+NON-PROBE: NOT DETECTED
SARS-COV-2 RNA NPH QL NAA+NON-PROBE: NOT DETECTED
SODIUM SERPL-SCNC: 141 MMOL/L (ref 136–145)
TROPONIN T SERPL-MCNC: <0.01 NG/ML (ref 0–0.03)
WBC NRBC COR # BLD: 11.27 10*3/MM3 (ref 3.4–10.8)

## 2022-10-27 PROCEDURE — 71046 X-RAY EXAM CHEST 2 VIEWS: CPT

## 2022-10-27 PROCEDURE — 93010 ELECTROCARDIOGRAM REPORT: CPT | Performed by: STUDENT IN AN ORGANIZED HEALTH CARE EDUCATION/TRAINING PROGRAM

## 2022-10-27 PROCEDURE — 85379 FIBRIN DEGRADATION QUANT: CPT | Performed by: EMERGENCY MEDICINE

## 2022-10-27 PROCEDURE — 94664 DEMO&/EVAL PT USE INHALER: CPT

## 2022-10-27 PROCEDURE — 85652 RBC SED RATE AUTOMATED: CPT | Performed by: EMERGENCY MEDICINE

## 2022-10-27 PROCEDURE — 0202U NFCT DS 22 TRGT SARS-COV-2: CPT | Performed by: EMERGENCY MEDICINE

## 2022-10-27 PROCEDURE — 93005 ELECTROCARDIOGRAM TRACING: CPT

## 2022-10-27 PROCEDURE — 99284 EMERGENCY DEPT VISIT MOD MDM: CPT

## 2022-10-27 PROCEDURE — 94640 AIRWAY INHALATION TREATMENT: CPT

## 2022-10-27 PROCEDURE — 80053 COMPREHEN METABOLIC PANEL: CPT | Performed by: EMERGENCY MEDICINE

## 2022-10-27 PROCEDURE — 94761 N-INVAS EAR/PLS OXIMETRY MLT: CPT

## 2022-10-27 PROCEDURE — 84145 PROCALCITONIN (PCT): CPT | Performed by: EMERGENCY MEDICINE

## 2022-10-27 PROCEDURE — 93005 ELECTROCARDIOGRAM TRACING: CPT | Performed by: EMERGENCY MEDICINE

## 2022-10-27 PROCEDURE — 25010000002 KETOROLAC TROMETHAMINE PER 15 MG: Performed by: EMERGENCY MEDICINE

## 2022-10-27 PROCEDURE — 84484 ASSAY OF TROPONIN QUANT: CPT | Performed by: EMERGENCY MEDICINE

## 2022-10-27 PROCEDURE — 94799 UNLISTED PULMONARY SVC/PX: CPT

## 2022-10-27 PROCEDURE — 83605 ASSAY OF LACTIC ACID: CPT | Performed by: EMERGENCY MEDICINE

## 2022-10-27 PROCEDURE — 85025 COMPLETE CBC W/AUTO DIFF WBC: CPT | Performed by: EMERGENCY MEDICINE

## 2022-10-27 PROCEDURE — 83735 ASSAY OF MAGNESIUM: CPT | Performed by: EMERGENCY MEDICINE

## 2022-10-27 PROCEDURE — 83880 ASSAY OF NATRIURETIC PEPTIDE: CPT | Performed by: EMERGENCY MEDICINE

## 2022-10-27 PROCEDURE — 96374 THER/PROPH/DIAG INJ IV PUSH: CPT

## 2022-10-27 RX ORDER — ALBUTEROL SULFATE 2.5 MG/3ML
2.5 SOLUTION RESPIRATORY (INHALATION) ONCE
Status: COMPLETED | OUTPATIENT
Start: 2022-10-27 | End: 2022-10-27

## 2022-10-27 RX ORDER — SODIUM CHLORIDE 0.9 % (FLUSH) 0.9 %
10 SYRINGE (ML) INJECTION AS NEEDED
Status: DISCONTINUED | OUTPATIENT
Start: 2022-10-27 | End: 2022-10-27 | Stop reason: HOSPADM

## 2022-10-27 RX ORDER — KETOROLAC TROMETHAMINE 15 MG/ML
15 INJECTION, SOLUTION INTRAMUSCULAR; INTRAVENOUS ONCE
Status: COMPLETED | OUTPATIENT
Start: 2022-10-27 | End: 2022-10-27

## 2022-10-27 RX ORDER — ACETAMINOPHEN 500 MG
1000 TABLET ORAL ONCE
Status: COMPLETED | OUTPATIENT
Start: 2022-10-27 | End: 2022-10-27

## 2022-10-27 RX ORDER — PREDNISONE 20 MG/1
20 TABLET ORAL 2 TIMES DAILY
Qty: 10 TABLET | Refills: 0 | Status: SHIPPED | OUTPATIENT
Start: 2022-10-27 | End: 2022-11-01

## 2022-10-27 RX ORDER — DOXYCYCLINE 100 MG/1
100 CAPSULE ORAL 2 TIMES DAILY
Qty: 14 CAPSULE | Refills: 0 | Status: SHIPPED | OUTPATIENT
Start: 2022-10-27 | End: 2022-11-03

## 2022-10-27 RX ORDER — ALBUTEROL SULFATE 90 UG/1
2 AEROSOL, METERED RESPIRATORY (INHALATION) EVERY 4 HOURS PRN
Qty: 1 G | Refills: 0 | Status: SHIPPED | OUTPATIENT
Start: 2022-10-27

## 2022-10-27 RX ADMIN — ALBUTEROL SULFATE 2.5 MG: 2.5 SOLUTION RESPIRATORY (INHALATION) at 08:49

## 2022-10-27 RX ADMIN — ACETAMINOPHEN 1000 MG: 500 TABLET ORAL at 09:08

## 2022-10-27 RX ADMIN — SODIUM CHLORIDE 1000 ML: 9 INJECTION, SOLUTION INTRAVENOUS at 09:12

## 2022-10-27 RX ADMIN — KETOROLAC TROMETHAMINE 15 MG: 15 INJECTION, SOLUTION INTRAMUSCULAR; INTRAVENOUS at 09:09
